# Patient Record
Sex: MALE | Race: WHITE | Employment: OTHER | RURAL
[De-identification: names, ages, dates, MRNs, and addresses within clinical notes are randomized per-mention and may not be internally consistent; named-entity substitution may affect disease eponyms.]

---

## 2017-02-15 ENCOUNTER — OFFICE VISIT (OUTPATIENT)
Dept: FAMILY MEDICINE CLINIC | Age: 52
End: 2017-02-15

## 2017-02-15 VITALS
HEART RATE: 79 BPM | WEIGHT: 217.4 LBS | BODY MASS INDEX: 27.91 KG/M2 | RESPIRATION RATE: 18 BRPM | SYSTOLIC BLOOD PRESSURE: 117 MMHG | DIASTOLIC BLOOD PRESSURE: 82 MMHG | OXYGEN SATURATION: 98 %

## 2017-02-15 DIAGNOSIS — Z00.00 ANNUAL PHYSICAL EXAM: Primary | ICD-10-CM

## 2017-02-15 DIAGNOSIS — R07.1 CHEST PAIN ON BREATHING: ICD-10-CM

## 2017-02-15 DIAGNOSIS — Z71.89 ACP (ADVANCE CARE PLANNING): ICD-10-CM

## 2017-02-15 DIAGNOSIS — F17.210 SMOKING GREATER THAN 30 PACK YEARS: ICD-10-CM

## 2017-02-15 NOTE — ACP (ADVANCE CARE PLANNING)
In the event that he is unable to speak for himself, contact his wife, Rodríguez Falcon, at [phone]  (unog)    Sherwin Cespedes

## 2017-02-15 NOTE — PROGRESS NOTES
Chief Complaint   Patient presents with    Physical         HPI:       is a 49 y.o. male. He is  to Doug (RN at [employer]) and has 2 adult children Arkaitlin Ruvalcaba and Juan Francisco). He is a  and lives in [place] [place] and works in [place]. 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 Smokes 1 PPD for 32 years. Recently experienced chest discomfort with deep breathing lasting a few seconds. No dyspnea. No cough or hemoptysis. Possible asbestos exposure in the [employer]. No weight loss. Appetite is good. Denies exertional pain. Not exercising. New Issues:  He has never had colonoscopy. Desires screening. No Known Allergies    Current Outpatient Prescriptions   Medication Sig    naproxen sodium (ALEVE) 220 mg tablet Take 220 mg by mouth two (2) times daily (with meals).  CYCLOBENZAPRINE HCL (FLEXERIL PO) Take 10 mg by mouth three (3) times daily as needed. No current facility-administered medications for this visit. Past Medical History   Diagnosis Date    Fatigue     Hyperlipidemia     Onychomycosis of toenail     PPD positive, treated      INH for 9 months         ROS:  Denies fever, chills, cough, chest pain, SOB,  nausea, vomiting, or diarrhea. Denies wt loss, wt gain, hemoptysis, hematochezia or melena. Physical Examination:    Visit Vitals    /82 (BP 1 Location: Left arm, BP Patient Position: Sitting)    Pulse 79    Resp 18    Wt 217 lb 6.4 oz (98.6 kg)    SpO2 98%    BMI 27.91 kg/m2     General: Alert and Ox3, Fluent speech  HEENT:  NC/AT, EOMI, OP: clear  Neck:  Supple, no adenopathy, JVD, mass or bruit  Chest:  Clear to Ausculation, without wheezes, rales, rubs or ronchi  Cardiac: RRR  Abdomen:  +BS, soft, nontender without palpable HSM  Extremities:  No cyanosis, clubbing or edema  Neurologic:  Ambulatory without assist, CN 2-12 grossly intact. Moves all extremities.   Skin: no rash  Lymphadenopathy: no cervical or supraclavicular nodes      ASSESSMENT AND PLAN:     1. Excellent BP  2. The patient was counseled on the dangers of tobacco use, and was advised to quit. Reviewed strategies to maximize success, including removing cigarettes and smoking materials from environment. 3.  EKG and CXR Today:  If negative and sx persist, schedule EST. Sx suggest a pleuritic etiology. No sign of DVT or PE based on exam findings today. 4.  Labs today  5. Referral to Dr Daisy Avery [facility]) for Colonoscopy    No orders of the defined types were placed in this encounter.       Ramon Sol MD, [place]

## 2017-02-15 NOTE — MR AVS SNAPSHOT
Visit Information Date & Time Provider Department Dept. Phone Encounter #  
 2/15/2017  9:00 AM Thierno Nguyen, Lori Clark [id number] Upcoming Health Maintenance Date Due Pneumococcal 19-64 Medium Risk (1 of 1 - PPSV23) 5/23/1985 DTaP/Tdap/Td series (1 - Tdap) 5/23/1987 FOBT Q 1 YEAR AGE 50-75 5/23/2016 INFLUENZA AGE 9 TO ADULT 8/1/2016 Allergies as of 2/15/2017  Review Complete On: 2/15/2017 By: Thierno Nguyen MD  
 No Known Allergies Current Immunizations  Never Reviewed Name Date Influenza Vaccine 10/15/2016 Not reviewed this visit You Were Diagnosed With   
  
 Codes Comments Annual physical exam    -  Primary ICD-10-CM: Z00.00 ICD-9-CM: V70.0 Chest pain on breathing     ICD-10-CM: R07.1 ICD-9-CM: 786.52   
 ACP (advance care planning)     ICD-10-CM: Z71.89 ICD-9-CM: V65.49 Smoking greater than 30 pack years     ICD-10-CM: F17.210 ICD-9-CM: 305.1 Vitals BP Pulse Resp Weight(growth percentile) SpO2 BMI  
 117/82 (BP 1 Location: Left arm, BP Patient Position: Sitting) 79 18 217 lb 6.4 oz (98.6 kg) 98% 27.91 kg/m2 Smoking Status Current Every Day Smoker Vitals History BMI and BSA Data Body Mass Index Body Surface Area  
 27.91 kg/m 2 2.27 m 2 Your Updated Medication List  
  
   
This list is accurate as of: 2/15/17  9:59 AM.  Always use your most recent med list.  
  
  
  
  
 ALEVE 220 mg tablet Generic drug:  naproxen sodium Take 220 mg by mouth two (2) times daily (with meals). FLEXERIL PO Take 10 mg by mouth three (3) times daily as needed. We Performed the Following AMB POC EKG ROUTINE W/ 12 LEADS, INTER & REP [99054 CPT(R)] CBC WITH AUTOMATED DIFF [10210 CPT(R)] HEPATITIS C AB [22924 CPT(R)] LIPID PANEL [52094 CPT(R)] METABOLIC PANEL, COMPREHENSIVE [60657 CPT(R)] MA COLLECTION VENOUS BLOOD,VENIPUNCTURE [id number] CPT(R)] PSA W/ REFLX FREE PSA [70407 CPT(R)] REFERRAL TO SURGERY [WRQ371 Custom] Comments:  
 Please evaluate patient for colonoscopy. TSH 3RD GENERATION [45952 CPT(R)] To-Do List   
 Around 02/28/2017 Imaging:  XR CHEST PA LAT Referral Information Referral ID Referred By Referred To  
  
 [id number] [facility], [facility], [address]  Phone: [phone] Fax: [phone] Visits Status Start Date End Date 1 New Request 2/15/17 2/15/18 If your referral has a status of pending review or denied, additional information will be sent to support the outcome of this decision. Patient Instructions If you have any questions regarding [email or web address], you may call [email or web address] support at [phone]. Introducing [facility] SERVICES! Candace Candelario introduces [employer] patient portal. Now you can access parts of your medical record, email your doctor's office, and request medication refills online. 1. In your internet browser, go to [email or web address] 2. Click on the First Time User? Click Here link in the Sign In box. You will see the New Member Sign Up page. 3. Enter your [employer] Access Code exactly as it appears below. You will not need to use this code after youve completed the sign-up process. If you do not sign up before the expiration date, you must request a new code. · [employer] Access Code: [email or web address] Expires: 5/16/2017  9:59 AM 
 
4. Enter the last four digits of your Social Security Number (xxxx) and Date of Birth (mm/dd/yyyy) as indicated and click Submit. You will be taken to the next sign-up page. 5. Create a [employer] ID. This will be your [employer] login ID and cannot be changed, so think of one that is secure and easy to remember. 6. Create a [employer] password. You can change your password at any time. 7. Enter your Password Reset Question and Answer. This can be used at a later time if you forget your password. 8. Enter your e-mail address. You will receive e-mail notification when new information is available in [address] 9. Click Sign Up. You can now view and download portions of your medical record. 10. Click the Download Summary menu link to download a portable copy of your medical information. If you have questions, please visit the Frequently Asked Questions section of the [employer] website. Remember, [employer] is NOT to be used for urgent needs. For medical emergencies, dial 911. Now available from your iPhone and Android! Please provide this summary of care documentation to your next provider. If you have any questions after today's visit, please call [phone].

## 2017-02-16 LAB
ALBUMIN SERPL-MCNC: 4.3 G/DL (ref 3.5–5.5)
ALBUMIN/GLOB SERPL: 2.2 {RATIO} (ref 1.1–2.5)
ALP SERPL-CCNC: 73 IU/L (ref 39–117)
ALT SERPL-CCNC: 10 IU/L (ref 0–44)
AST SERPL-CCNC: 14 IU/L (ref 0–40)
BASOPHILS # BLD AUTO: 0 X10E3/UL (ref 0–0.2)
BASOPHILS NFR BLD AUTO: 0 %
BILIRUB SERPL-MCNC: 0.4 MG/DL (ref 0–1.2)
BUN SERPL-MCNC: 9 MG/DL (ref 6–24)
BUN/CREAT SERPL: 10 (ref 9–20)
CALCIUM SERPL-MCNC: 9 MG/DL (ref 8.7–10.2)
CHLORIDE SERPL-SCNC: 102 MMOL/L (ref 96–106)
CHOLEST SERPL-MCNC: 147 MG/DL (ref 100–199)
CO2 SERPL-SCNC: 20 MMOL/L (ref 18–29)
CREAT SERPL-MCNC: 0.87 MG/DL (ref 0.76–1.27)
EOSINOPHIL # BLD AUTO: 0.2 X10E3/UL (ref 0–0.4)
EOSINOPHIL NFR BLD AUTO: 3 %
ERYTHROCYTE [DISTWIDTH] IN BLOOD BY AUTOMATED COUNT: 12.7 % (ref 12.3–15.4)
GLOBULIN SER CALC-MCNC: 2 G/DL (ref 1.5–4.5)
GLUCOSE SERPL-MCNC: 81 MG/DL (ref 65–99)
HCT VFR BLD AUTO: 44.1 % (ref 37.5–51)
HCV AB S/CO SERPL IA: <0.1 S/CO RATIO (ref 0–0.9)
HDLC SERPL-MCNC: 36 MG/DL
HGB BLD-MCNC: 15.1 G/DL (ref 12.6–17.7)
IMM GRANULOCYTES # BLD: 0.1 X10E3/UL (ref 0–0.1)
IMM GRANULOCYTES NFR BLD: 1 %
LDLC SERPL CALC-MCNC: 87 MG/DL (ref 0–99)
LYMPHOCYTES # BLD AUTO: 1.2 X10E3/UL (ref 0.7–3.1)
LYMPHOCYTES NFR BLD AUTO: 18 %
MCH RBC QN AUTO: 29.7 PG (ref 26.6–33)
MCHC RBC AUTO-ENTMCNC: 34.2 G/DL (ref 31.5–35.7)
MCV RBC AUTO: 87 FL (ref 79–97)
MONOCYTES # BLD AUTO: 1 X10E3/UL (ref 0.1–0.9)
MONOCYTES NFR BLD AUTO: 14 %
NEUTROPHILS # BLD AUTO: 4.2 X10E3/UL (ref 1.4–7)
NEUTROPHILS NFR BLD AUTO: 64 %
PLATELET # BLD AUTO: 193 X10E3/UL (ref 150–379)
POTASSIUM SERPL-SCNC: 5 MMOL/L (ref 3.5–5.2)
PROT SERPL-MCNC: 6.3 G/DL (ref 6–8.5)
PSA SERPL-MCNC: 1.2 NG/ML (ref 0–4)
RBC # BLD AUTO: 5.08 X10E6/UL (ref 4.14–5.8)
REFLEX CRITERIA: NORMAL
SODIUM SERPL-SCNC: 140 MMOL/L (ref 134–144)
TRIGL SERPL-MCNC: 118 MG/DL (ref 0–149)
TSH SERPL DL<=0.005 MIU/L-ACNC: 0.87 UIU/ML (ref 0.45–4.5)
VLDLC SERPL CALC-MCNC: 24 MG/DL (ref 5–40)
WBC # BLD AUTO: 6.7 X10E3/UL (ref 3.4–10.8)

## 2017-02-17 DIAGNOSIS — Z00.00 ANNUAL PHYSICAL EXAM: ICD-10-CM

## 2018-04-19 ENCOUNTER — OFFICE VISIT (OUTPATIENT)
Dept: FAMILY MEDICINE CLINIC | Age: 53
End: 2018-04-19

## 2018-04-19 VITALS
BODY MASS INDEX: 27.7 KG/M2 | RESPIRATION RATE: 12 BRPM | HEART RATE: 68 BPM | SYSTOLIC BLOOD PRESSURE: 130 MMHG | OXYGEN SATURATION: 99 % | TEMPERATURE: 97.9 F | WEIGHT: 215.8 LBS | HEIGHT: 74 IN | DIASTOLIC BLOOD PRESSURE: 84 MMHG

## 2018-04-19 DIAGNOSIS — Z00.00 ROUTINE GENERAL MEDICAL EXAMINATION AT A HEALTH CARE FACILITY: Primary | ICD-10-CM

## 2018-04-19 NOTE — PROGRESS NOTES
1. Have you been to the ER, urgent care clinic since your last visit? Hospitalized since your last visit? No    2. Have you seen or consulted any other health care providers outside of the [address] [place] [address] since your last visit? Include any pap smears or colon screening.  Yes When: 5-2017 babita (work physical)

## 2018-04-19 NOTE — ACP (ADVANCE CARE PLANNING)
Pt has advance directive at home. Recommended to bring by the clinic for inclusion in chart at patient's convenience.  Discussed 4/19/2018

## 2018-04-19 NOTE — PROGRESS NOTES
Chief Complaint   Patient presents with    Annual Wellness Visit         HPI:      Rian Holland is a 47 y.o. male. He is  to Ezra Ben (RN at hospitals) and has 2 adult children Shira Don and Juan Francisco). He is a  and lives in [facility] and works in [place]. 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 Smokes 1 PPD for 32 years. Recently experienced chest discomfort with deep breathing lasting a few seconds. No dyspnea. No cough or hemoptysis. Possible asbestos exposure in the [employer]. No weight loss. Appetite is good. Denies exertional pain. No Known Allergies    Current Outpatient Prescriptions   Medication Sig    naproxen sodium (ALEVE) 220 mg tablet Take 220 mg by mouth two (2) times daily (with meals).  CYCLOBENZAPRINE HCL (FLEXERIL PO) Take 10 mg by mouth three (3) times daily as needed. No current facility-administered medications for this visit. Past Medical History:   Diagnosis Date    Fatigue     Hyperlipidemia     Onychomycosis of toenail     PPD positive, treated     INH for 9 months         ROS:  Denies fever, chills, cough, chest pain, SOB,  nausea, vomiting, or diarrhea. Denies wt loss, wt gain, hemoptysis, hematochezia or melena. Physical Examination:    /84 (BP 1 Location: Left arm, BP Patient Position: Sitting)  Pulse 68  Temp 97.9 °F (36.6 °C) (Oral)   Resp 12  Ht 6' 2\" (1.88 m)  Wt 215 lb 12.8 oz (97.9 kg)  SpO2 99%  BMI 27.71 kg/m2    General: Alert and Ox3, Fluent speech  HEENT:  NC/AT, EOMI, OP: clear  Neck:  Supple, no adenopathy, JVD, mass or bruit  Chest:  Clear to Ausculation, without wheezes, rales, rubs or ronchi  Cardiac: RRR  Abdomen:  +BS, soft, nontender without palpable HSM  Extremities:  No cyanosis, clubbing or edema  Neurologic:  Ambulatory without assist, CN 2-12 grossly intact. Moves all extremities. Skin: no rash  Lymphadenopathy: no cervical or supraclavicular nodes      ASSESSMENT AND PLAN:     1.   He appears to be in good health. Advised to quit tobacco.  Working on this. 2.  ACP update  3. He plans to schedule his colonscopy    No orders of the defined types were placed in this encounter.       Miguel Naranjo MD, [address]

## 2018-04-19 NOTE — MR AVS SNAPSHOT
[address] [facility] [phone] Patient: Eduardo Bello MRN: [id number] WWU:[id number] Visit Information Date & Time Provider Department Dept. Phone Encounter #  
 4/19/2018 10:30 AM Radha Velasquez MD [address] [place] [address] [id number] Follow-up Instructions Return in about 1 year (around 4/19/2019). Follow-up and Disposition History Upcoming Health Maintenance Date Due Pneumococcal 19-64 Medium Risk (1 of 1 - PPSV23) 5/23/1985 DTaP/Tdap/Td series (1 - Tdap) 5/23/1987 FOBT Q 1 YEAR AGE 50-75 5/23/2016 Influenza Age 5 to Adult 8/1/2017 Allergies as of 4/19/2018  Review Complete On: 4/19/2018 By: Radha Velasquez MD  
 No Known Allergies Current Immunizations  Never Reviewed Name Date Influenza Vaccine 10/15/2016 Not reviewed this visit You Were Diagnosed With   
  
 Codes Comments Routine general medical examination at a health care facility    -  Primary ICD-10-CM: Z00.00 ICD-9-CM: V70.0 Vitals BP Pulse Temp Resp Height(growth percentile) Weight(growth percentile) 130/84 (BP 1 Location: Left arm, BP Patient Position: Sitting) 68 97.9 °F (36.6 °C) (Oral) 12 6' 2\" (1.88 m) 215 lb 12.8 oz (97.9 kg) SpO2 BMI Smoking Status 99% 27.71 kg/m2 Current Every Day Smoker BMI and BSA Data Body Mass Index Body Surface Area  
 27.71 kg/m 2 2.26 m 2 Your Updated Medication List  
  
   
This list is accurate as of 4/19/18 11:08 AM.  Always use your most recent med list.  
  
  
  
  
 ALEVE 220 mg tablet Generic drug:  naproxen sodium Take 220 mg by mouth two (2) times daily (with meals). FLEXERIL PO Take 10 mg by mouth three (3) times daily as needed. We Performed the Following CBC WITH AUTOMATED DIFF [31321 CPT(R)] HEMOGLOBIN A1C WITH EAG [32647 CPT(R)] LIPID PANEL [81550 CPT(R)] METABOLIC PANEL, COMPREHENSIVE [42018 CPT(R)] PSA, DIAGNOSTIC (PROSTATE SPECIFIC AG) R0200189 CPT(R)] TSH 3RD GENERATION [38693 CPT(R)] Follow-up Instructions Return in about 1 year (around 4/19/2019). Introducing [facility] SERVICES! Dear Ruth Holden: 
Thank you for requesting a [employer] account. Our records indicate that you already have an active [employer] account. You can access your account anytime at [email or web address] Did you know that you can access your hospital and ER discharge instructions at any time in [employer]? You can also review all of your test results from your hospital stay or ER visit. Additional Information If you have questions, please visit the Frequently Asked Questions section of the [employer] website at [email or web address] Remember, [employer] is NOT to be used for urgent needs. For medical emergencies, dial 911. Now available from your iPhone and Android! Please provide this summary of care documentation to your next provider. If you have any questions after today's visit, please call [phone].

## 2018-04-20 LAB
ALBUMIN SERPL-MCNC: 4.4 G/DL (ref 3.5–5.5)
ALBUMIN/GLOB SERPL: 1.8 {RATIO} (ref 1.2–2.2)
ALP SERPL-CCNC: 66 IU/L (ref 39–117)
ALT SERPL-CCNC: 14 IU/L (ref 0–44)
AST SERPL-CCNC: 13 IU/L (ref 0–40)
BASOPHILS # BLD AUTO: 0 X10E3/UL (ref 0–0.2)
BASOPHILS NFR BLD AUTO: 0 %
BILIRUB SERPL-MCNC: 0.4 MG/DL (ref 0–1.2)
BUN SERPL-MCNC: 10 MG/DL (ref 6–24)
BUN/CREAT SERPL: 10 (ref 9–20)
CALCIUM SERPL-MCNC: 9.1 MG/DL (ref 8.7–10.2)
CHLORIDE SERPL-SCNC: 101 MMOL/L (ref 96–106)
CHOLEST SERPL-MCNC: 160 MG/DL (ref 100–199)
CO2 SERPL-SCNC: 25 MMOL/L (ref 18–29)
CREAT SERPL-MCNC: 1 MG/DL (ref 0.76–1.27)
EOSINOPHIL # BLD AUTO: 0.1 X10E3/UL (ref 0–0.4)
EOSINOPHIL NFR BLD AUTO: 2 %
ERYTHROCYTE [DISTWIDTH] IN BLOOD BY AUTOMATED COUNT: 13.6 % (ref 12.3–15.4)
EST. AVERAGE GLUCOSE BLD GHB EST-MCNC: 100 MG/DL
GFR SERPLBLD CREATININE-BSD FMLA CKD-EPI: 100 ML/MIN/1.73
GFR SERPLBLD CREATININE-BSD FMLA CKD-EPI: 87 ML/MIN/1.73
GLOBULIN SER CALC-MCNC: 2.4 G/DL (ref 1.5–4.5)
GLUCOSE SERPL-MCNC: 79 MG/DL (ref 65–99)
HBA1C MFR BLD: 5.1 % (ref 4.8–5.6)
HCT VFR BLD AUTO: 45.9 % (ref 37.5–51)
HDLC SERPL-MCNC: 40 MG/DL
HGB BLD-MCNC: 15.9 G/DL (ref 13–17.7)
IMM GRANULOCYTES # BLD: 0 X10E3/UL (ref 0–0.1)
IMM GRANULOCYTES NFR BLD: 0 %
LDLC SERPL CALC-MCNC: 101 MG/DL (ref 0–99)
LYMPHOCYTES # BLD AUTO: 1.9 X10E3/UL (ref 0.7–3.1)
LYMPHOCYTES NFR BLD AUTO: 28 %
MCH RBC QN AUTO: 30.4 PG (ref 26.6–33)
MCHC RBC AUTO-ENTMCNC: 34.6 G/DL (ref 31.5–35.7)
MCV RBC AUTO: 88 FL (ref 79–97)
MONOCYTES # BLD AUTO: 0.7 X10E3/UL (ref 0.1–0.9)
MONOCYTES NFR BLD AUTO: 10 %
NEUTROPHILS # BLD AUTO: 4.2 X10E3/UL (ref 1.4–7)
NEUTROPHILS NFR BLD AUTO: 60 %
PLATELET # BLD AUTO: 232 X10E3/UL (ref 150–379)
POTASSIUM SERPL-SCNC: 4.1 MMOL/L (ref 3.5–5.2)
PROT SERPL-MCNC: 6.8 G/DL (ref 6–8.5)
PSA SERPL-MCNC: 1.3 NG/ML (ref 0–4)
RBC # BLD AUTO: 5.23 X10E6/UL (ref 4.14–5.8)
SODIUM SERPL-SCNC: 143 MMOL/L (ref 134–144)
TRIGL SERPL-MCNC: 96 MG/DL (ref 0–149)
TSH SERPL DL<=0.005 MIU/L-ACNC: 0.98 UIU/ML (ref 0.45–4.5)
VLDLC SERPL CALC-MCNC: 19 MG/DL (ref 5–40)
WBC # BLD AUTO: 6.9 X10E3/UL (ref 3.4–10.8)

## 2021-03-05 PROBLEM — Z12.2 ENCOUNTER FOR SCREENING FOR LUNG CANCER: Status: ACTIVE | Noted: 2021-03-05

## 2021-03-05 PROBLEM — R53.83 OTHER FATIGUE: Status: ACTIVE | Noted: 2021-03-05

## 2021-03-05 PROBLEM — R35.1 NOCTURIA: Status: ACTIVE | Noted: 2021-03-05

## 2021-05-03 ENCOUNTER — OFFICE VISIT (OUTPATIENT)
Dept: SURGERY | Age: 56
End: 2021-05-03

## 2021-05-03 VITALS
HEIGHT: 74 IN | DIASTOLIC BLOOD PRESSURE: 85 MMHG | WEIGHT: 230.3 LBS | BODY MASS INDEX: 29.56 KG/M2 | RESPIRATION RATE: 16 BRPM | SYSTOLIC BLOOD PRESSURE: 115 MMHG | HEART RATE: 72 BPM

## 2021-05-03 DIAGNOSIS — Z12.11 COLON CANCER SCREENING: Primary | ICD-10-CM

## 2021-05-03 NOTE — PROGRESS NOTES
Joe Redd is a 48 y.o. male who presents today with the following:  Chief Complaint   Patient presents with    Colon Cancer Screening       HPI    60-year-old male who presents to us as a referral by Dr. Kaylene Hemphill for possible screening colonoscopy. He has had no prior colon evaluation. He has had no family history of colon cancer. He denies any melena or hematochezia or diarrhea or constipation. He denies any unexpected weight loss or change in the caliber of his stool. A few years back he had a single episode of diverticulitis that was outpatient treated. He has completed his COVID-19 vaccinations. His only surgery is a left knee arthroscopy. He stopped smoking 3 years ago prior to which he used to smoke up to 2 packs a day for 35 years. Occasionally on the weekends he will have 1-2 mixed drinks. He is not on any blood thinners. Past Medical History:   Diagnosis Date    Fatigue     Hyperlipidemia     Onychomycosis of toenail     PPD positive, treated     INH for 9 months       Past Surgical History:   Procedure Laterality Date    HX KNEE ARTHROSCOPY Left        Social History     Socioeconomic History    Marital status:      Spouse name: Not on file    Number of children: 2    Years of education: Not on file    Highest education level: Not on file   Occupational History    Occupation:    Social Needs    Financial resource strain: Not on file    Food insecurity     Worry: Not on file     Inability: Not on file   [employer] needs     Medical: Not on file     Non-medical: Not on file   Tobacco Use    Smoking status: Former Smoker     Quit date: 2019     Years since quittin.3    Smokeless tobacco: Current User    Tobacco comment: Vapes.    Substance and Sexual Activity    Alcohol use: Yes     Comment: Social    Drug use: Not on file    Sexual activity: Not on file   Lifestyle    Physical activity     Days per week: Not on file     Minutes per session: Not on file    Stress: Not on file   Relationships    Social connections     Talks on phone: Not on file     Gets together: Not on file     Attends Che[employer] service: Not on file     Active member of club or organization: Not on file     Attends meetings of clubs or organizations: Not on file     Relationship status: Not on file    Intimate partner violence     Fear of current or ex partner: Not on file     Emotionally abused: Not on file     Physically abused: Not on file     Forced sexual activity: Not on file   Other Topics Concern    Not on file   Social History Narrative    Not on file       Family History   Problem Relation Age of Onset    Diabetes Mother     Hypertension Mother     Arthritis-rheumatoid Father     Cancer Father         Lung       No Known Allergies    Current Outpatient Medications   Medication Sig    naproxen sodium (ALEVE) 220 mg tablet Take 220 mg by mouth two (2) times daily (with meals).  CYCLOBENZAPRINE HCL (FLEXERIL PO) Take 10 mg by mouth three (3) times daily as needed.  ondansetron (ZOFRAN ODT) 8 mg disintegrating tablet Take 1 Tab by mouth every eight (8) hours as needed for Nausea. No current facility-administered medications for this visit. The above histories, medications and allergies have been reviewed. Review of Systems   HENT: Positive for congestion and tinnitus. Respiratory: Positive for cough. Musculoskeletal: Positive for back pain. Skin: Positive for itching. Visit Vitals  /85 (BP 1 Location: Left upper arm, BP Patient Position: Sitting)   Pulse 72   Resp 16   Ht 6' 2\" (1.88 m)   Wt 230 lb 4.8 oz (104.5 kg)   BMI 29.57 kg/m²     Physical Exam  Constitutional:       Appearance: Normal appearance. Cardiovascular:      Rate and Rhythm: Normal rate and regular rhythm. Pulmonary:      Effort: Pulmonary effort is normal.      Breath sounds: Normal breath sounds. Abdominal:      General: There is no distension. Palpations: Abdomen is soft. There is no mass. Tenderness: There is no abdominal tenderness. Neurological:      Mental Status: He is alert. 1. Colon cancer screening  Recommend colonoscopy. The procedure was explained in detail including the risks and benefits. Risks shared included risks of missed lesions, incomplete exam, colon injury or perforation. Risks associated with anesthesia were also discussed. The patient wishes to proceed and we will schedule. The patient was counseled at length about the risks of niki Covid-19 during their perioperative period and any recovery window from their procedure. The patient was made aware that niki Covid-19  may worsen their prognosis for recovering from their procedure and lend to a higher morbidity and/or mortality risk. All material risks, benefits, and reasonable alternatives including postponing the procedure were discussed. The patient does  wish to proceed with the procedure at this time. Follow-up and Dispositions    · Return for post procedure.          Ayesha Bertrand MD

## 2021-05-03 NOTE — PATIENT INSTRUCTIONS

## 2021-05-03 NOTE — PROGRESS NOTES
1. Have you been to the ER, urgent care clinic since your last visit? Hospitalized since your last visit? new pt    2. Have you seen or consulted any other health care providers outside of the [address] [place] [address] since your last visit? Include any pap smears or colon screening.  new pt

## 2021-05-26 ENCOUNTER — ANESTHESIA EVENT (OUTPATIENT)
Dept: SURGERY | Age: 56
End: 2021-05-26
Payer: COMMERCIAL

## 2021-05-26 NOTE — ANESTHESIA PREPROCEDURE EVALUATION
Relevant Problems   No relevant active problems       Anesthetic History   No history of anesthetic complications            Review of Systems / Medical History  Patient summary reviewed, nursing notes reviewed and pertinent labs reviewed    Pulmonary          Smoker (former)      Comments: H/O positive PPD   Neuro/Psych   Within defined limits           Cardiovascular              Hyperlipidemia         GI/Hepatic/Renal  Within defined limits              Endo/Other  Within defined limits           Other Findings              Physical Exam    Airway  Mallampati: I  TM Distance: > 6 cm  Neck ROM: normal range of motion   Mouth opening: Normal     Cardiovascular    Rhythm: regular  Rate: normal         Dental  No notable dental hx       Pulmonary  Breath sounds clear to auscultation               Abdominal  GI exam deferred       Other Findings            Anesthetic Plan    ASA: 2  Anesthesia type: MAC          Induction: Intravenous  Anesthetic plan and risks discussed with: Patient

## 2021-06-11 NOTE — PERIOP NOTES
[address] [place] [address]  SURGICAL PRE-ADMISSION INSTRUCTIONS    ARRIVAL  · You will be called the day before your surgery with your expected arrival time. · Sign in at the  of the hospital.  You will be directed to the Surgical Waiting Room. · Please arrive at your scheduled appointment time. You have been scheduled to arrive for your procedure one or two hours prior to the expected start time of your procedure. · Every effort will be made to minimize your wait but please be aware that unforeseen circumstances may affect our schedule. EATING  · DO NOT EAT OR DRINK ANYTHING AFTER MIDNIGHT ON THE EVENING BEFORE YOUR SURGERY OR ON THE DAY OF YOUR SURGERY except for your medications (as instructed) with a sip of water. · Do not use gum, mints or lozenges on the morning of your surgery. · Please do not smoke or chew tobacco before your surgery. MEDICATIONS   · Take the following medications on the morning of your surgery with the smallest amount of water possible : NONE    STOP THESE MEDICATIONS AT THE TIMES LISTED BELOW  NSAIDS (Indocin, Ibuprofen, Naprosyn) ;  7 days before     DRIVING/TRANSPORATION  · Have a responsible adult to drive you home from the hospital and to stay with you over night. Please have them plan to remain in the hospital during your surgery. Your surgery will not be done if you do not have a responsible adult to take you home and to stay with you. · If you have arranged for public transport, you must have a responsible adult to ride with you (who is not the ). · You may not drive for 24 hours after anesthesia. PREPARATION  · If you have a Living WiIl/Advance Directive, please bring a copy with you to scan into your chart. · Please DO NOT wear makeup or nail polish  · Please leave valuables at home,  DO NOT wear jewelry. · Wear loose, comfortable clothing that is large enough to cover a bulky dressing.     SPECIAL INSTRUCTIONS:  · Follow your surgeon's instructions for preoperative bowel prep.     Reviewed above preoperative instructions and answered questions during preadmission testing consult appointment    Patient:  Yanelisla Kelsey   Date:     June 11, 2021  Time:   11:39 AM    RN:  Bianca Beard RN    Date:     June 11, 2021  Time:   11:39 AM

## 2021-06-16 NOTE — H&P
Jose Davis is a 55 y.o. male who presents today with the following:  No chief complaint on file. HPI    64-year-old male who presents to us as a referral by Dr. Betzy Humphries for possible screening colonoscopy. He has had no prior colon evaluation. He has had no family history of colon cancer. He denies any melena or hematochezia or diarrhea or constipation. He denies any unexpected weight loss or change in the caliber of his stool. A few years back he had a single episode of diverticulitis that was outpatient treated. He has completed his COVID-19 vaccinations. His only surgery is a left knee arthroscopy. He stopped smoking 3 years ago prior to which he used to smoke up to 2 packs a day for 35 years. Occasionally on the weekends he will have 1-2 mixed drinks. He is not on any blood thinners. Past Medical History:   Diagnosis Date    Fatigue     Hyperlipidemia     Onychomycosis of toenail     PPD positive, treated     INH for 9 months       Past Surgical History:   Procedure Laterality Date    HX KNEE ARTHROSCOPY Left        Social History     Socioeconomic History    Marital status:      Spouse name: Not on file    Number of children: 2    Years of education: Not on file    Highest education level: Not on file   Occupational History    Occupation:    Tobacco Use    Smoking status: Former Smoker     Quit date: 2019     Years since quittin.4    Smokeless tobacco: Current User    Tobacco comment: Vapes.    Substance and Sexual Activity    Alcohol use: Yes     Comment: Social    Drug use: Not on file    Sexual activity: Not on file   Other Topics Concern    Not on file   Social History Narrative    Not on file     Social Determinants of Health     Financial Resource Strain:     Difficulty of Paying Living Expenses:    Food Insecurity:     Worried About Running Out of Food in the Last Year:     [address] in the Last Year:    Transportation Needs:     Lack of Transportation (Medical):  Lack of Transportation (Non-Medical):    Physical Activity:     Days of Exercise per Week:     Minutes of Exercise per Session:    Stress:     Feeling of Stress :    Social Connections:     Frequency of Communication with Friends and Family:     Frequency of Social Gatherings with Friends and Family:     Attends [employer] Services:     Active Member of Clubs or Organizations:     Attends Club or Organization Meetings:     Marital Status:    Intimate Partner Violence:     Fear of Current or Ex-Partner:     Emotionally Abused:     Physically Abused:     Sexually Abused:        Family History   Problem Relation Age of Onset    Diabetes Mother     Hypertension Mother     Arthritis-rheumatoid Father     Cancer Father         Lung       No Known Allergies    No current facility-administered medications for this encounter. Current Outpatient Medications   Medication Sig    naproxen sodium (ALEVE) 220 mg tablet Take 220 mg by mouth two (2) times daily (with meals).  CYCLOBENZAPRINE HCL (FLEXERIL PO) Take 10 mg by mouth three (3) times daily as needed. The above histories, medications and allergies have been reviewed. Review of Systems   HENT: Positive for congestion and tinnitus. Respiratory: Positive for cough. Musculoskeletal: Positive for back pain. Skin: Positive for itching. There were no vitals taken for this visit. Physical Exam  Constitutional:       Appearance: Normal appearance. Cardiovascular:      Rate and Rhythm: Normal rate and regular rhythm. Pulmonary:      Effort: Pulmonary effort is normal.      Breath sounds: Normal breath sounds. Abdominal:      General: There is no distension. Palpations: Abdomen is soft. There is no mass. Tenderness: There is no abdominal tenderness. Neurological:      Mental Status: He is alert. 1. Colon cancer screening  Recommend colonoscopy.   The procedure was explained in detail including the risks and benefits. Risks shared included risks of missed lesions, incomplete exam, colon injury or perforation. Risks associated with anesthesia were also discussed. The patient wishes to proceed and we will schedule. The patient was counseled at length about the risks of niki Covid-19 during their perioperative period and any recovery window from their procedure. The patient was made aware that niki Covid-19  may worsen their prognosis for recovering from their procedure and lend to a higher morbidity and/or mortality risk. All material risks, benefits, and reasonable alternatives including postponing the procedure were discussed. The patient does  wish to proceed with the procedure at this time.               Pj Muse MD

## 2021-06-17 ENCOUNTER — ANESTHESIA (OUTPATIENT)
Dept: SURGERY | Age: 56
End: 2021-06-17
Payer: COMMERCIAL

## 2021-06-17 ENCOUNTER — HOSPITAL ENCOUNTER (OUTPATIENT)
Age: 56
Setting detail: OUTPATIENT SURGERY
Discharge: HOME | End: 2021-06-17
Attending: SURGERY | Admitting: SURGERY
Payer: COMMERCIAL

## 2021-06-17 VITALS
HEIGHT: 74 IN | WEIGHT: 230 LBS | SYSTOLIC BLOOD PRESSURE: 125 MMHG | RESPIRATION RATE: 16 BRPM | BODY MASS INDEX: 29.52 KG/M2 | TEMPERATURE: 97 F | OXYGEN SATURATION: 99 % | DIASTOLIC BLOOD PRESSURE: 95 MMHG | HEART RATE: 61 BPM

## 2021-06-17 LAB — COLONOSCOPY, EXTERNAL: NORMAL

## 2021-06-17 PROCEDURE — 74011250636 HC RX REV CODE- 250/636: Performed by: ANESTHESIOLOGY

## 2021-06-17 PROCEDURE — 76060000032 HC ANESTHESIA 0.5 TO 1 HR: Performed by: SURGERY

## 2021-06-17 PROCEDURE — 88305 TISSUE EXAM BY PATHOLOGIST: CPT

## 2021-06-17 PROCEDURE — 77030037006 HC FCPS BIOP ENDOSC DISP OCOA -A: Performed by: SURGERY

## 2021-06-17 PROCEDURE — 76010000138 HC OR TIME 0.5 TO 1 HR: Performed by: SURGERY

## 2021-06-17 PROCEDURE — 74011250636 HC RX REV CODE- 250/636: Performed by: SURGERY

## 2021-06-17 PROCEDURE — 76210000063 HC OR PH I REC FIRST 0.5 HR: Performed by: SURGERY

## 2021-06-17 PROCEDURE — 77030003657 HC NDL SCLER BSC -B: Performed by: SURGERY

## 2021-06-17 PROCEDURE — 77030020018 HC MRKR ENDOSC SPOT 5ML SYR GISP -B: Performed by: SURGERY

## 2021-06-17 PROCEDURE — 2709999900 HC NON-CHARGEABLE SUPPLY: Performed by: SURGERY

## 2021-06-17 RX ORDER — SODIUM CHLORIDE, SODIUM LACTATE, POTASSIUM CHLORIDE, CALCIUM CHLORIDE 600; 310; 30; 20 MG/100ML; MG/100ML; MG/100ML; MG/100ML
125 INJECTION, SOLUTION INTRAVENOUS CONTINUOUS
Status: DISCONTINUED | OUTPATIENT
Start: 2021-06-17 | End: 2021-06-17 | Stop reason: HOSPADM

## 2021-06-17 RX ORDER — PROPOFOL 10 MG/ML
INJECTION, EMULSION INTRAVENOUS AS NEEDED
Status: DISCONTINUED | OUTPATIENT
Start: 2021-06-17 | End: 2021-06-17 | Stop reason: HOSPADM

## 2021-06-17 RX ORDER — SODIUM CHLORIDE 0.9 % (FLUSH) 0.9 %
5-40 SYRINGE (ML) INJECTION AS NEEDED
Status: DISCONTINUED | OUTPATIENT
Start: 2021-06-17 | End: 2021-06-17 | Stop reason: HOSPADM

## 2021-06-17 RX ORDER — SODIUM CHLORIDE 0.9 % (FLUSH) 0.9 %
5-40 SYRINGE (ML) INJECTION EVERY 8 HOURS
Status: DISCONTINUED | OUTPATIENT
Start: 2021-06-17 | End: 2021-06-17 | Stop reason: HOSPADM

## 2021-06-17 RX ORDER — MIDAZOLAM HYDROCHLORIDE 1 MG/ML
INJECTION, SOLUTION INTRAMUSCULAR; INTRAVENOUS AS NEEDED
Status: DISCONTINUED | OUTPATIENT
Start: 2021-06-17 | End: 2021-06-17 | Stop reason: HOSPADM

## 2021-06-17 RX ADMIN — MIDAZOLAM 4 MG: 1 INJECTION INTRAMUSCULAR; INTRAVENOUS at 09:40

## 2021-06-17 RX ADMIN — PROPOFOL 50 MG: 10 INJECTION, EMULSION INTRAVENOUS at 09:40

## 2021-06-17 RX ADMIN — PROPOFOL 20 MG: 10 INJECTION, EMULSION INTRAVENOUS at 09:46

## 2021-06-17 RX ADMIN — PROPOFOL 30 MG: 10 INJECTION, EMULSION INTRAVENOUS at 10:02

## 2021-06-17 RX ADMIN — PROPOFOL 20 MG: 10 INJECTION, EMULSION INTRAVENOUS at 09:54

## 2021-06-17 RX ADMIN — PROPOFOL 50 MG: 10 INJECTION, EMULSION INTRAVENOUS at 09:43

## 2021-06-17 RX ADMIN — SODIUM CHLORIDE, POTASSIUM CHLORIDE, SODIUM LACTATE AND CALCIUM CHLORIDE 125 ML/HR: 600; 310; 30; 20 INJECTION, SOLUTION INTRAVENOUS at 09:33

## 2021-06-17 RX ADMIN — PROPOFOL 30 MG: 10 INJECTION, EMULSION INTRAVENOUS at 09:49

## 2021-06-17 NOTE — BRIEF OP NOTE
Brief Postoperative Note    Patient: Hilda Bass  YOB: 1965  MRN: [id number]    Date of Procedure: 6/17/2021     Pre-Op Diagnosis: SCREENING    Post-Op Diagnosis: Same as preoperative diagnosis. Procedure(s):  COLONOSCOPY WITH COLD FORCEP POLYPECTOMIES X 6  AND PROXIMAL TRANSVERSE TATOOING    Surgeon(s):  Carlos De Jesus MD    Surgical Assistant: None    Anesthesia: MAC     Estimated Blood Loss (mL): Minimal    Complications: None    Specimens:   ID Type Source Tests Collected by Time Destination   1 : Ascending colon lipoma Preservative Colon, Ascending  Carlos De Jesus MD 6/17/2021 0499 Pathology   2 : Proximal Transverse colon polyp Preservative Colon, Transverse  Carlos De Jesus MD 6/17/2021 8076 Pathology   3 : Polyp at 90 cm Preservative Colon  Carlos De Jesus MD 6/17/2021 1013 Pathology   4 : Polyp at 85 cm Preservative Colon  Carlos De Jesus MD 6/17/2021 1016 Pathology   5 : Polyps X 2 at 80 cm Preservative Colon  Carlos De Jesus MD 6/17/2021 1017 Pathology   6 : Biopsy of Colonic Mucosa at 70 cm Preservative Colon  Carlos De Jesus MD 6/17/2021 1018 Pathology   7 : Polyp at 60 cm Preservative Colon  Carlos De Jesus MD 6/17/2021 1019 Pathology        Implants: * No implants in log *    Drains: * No LDAs found *    Findings: 1. Multiple polyps with sessile polyp at proximal transverse colon biopsied and tattoo injection placed distal to lesion. 2. Ascending colon lipoma  3. Diverticulosis    4. Internal and external hemorrhoids  5.  Slightly raised mucosa at 70 cm      Electronically Signed by Osbaldo Khanna MD on 6/17/2021 at 10:22 AM

## 2021-06-17 NOTE — DISCHARGE INSTRUCTIONS
Patient Education        Colonoscopy: What to Expect at [address] [place] [address]  After a colonoscopy, you'll stay at the clinic for 1 to 2 hours until the medicines wear off. Then you can go home. But you'll need to arrange for a ride. Your doctor will tell you when you can eat and do your other usual activities. Your doctor will talk to you about when you'll need your next colonoscopy. Your doctor can help you decide how often you need to be checked. This will depend on the results of your test and your risk for colorectal cancer. After the test, you may be bloated or have gas pains. You may need to pass gas. If a biopsy was done or a polyp was removed, you may have streaks of blood in your stool (feces) for a few days. Problems such as heavy rectal bleeding may not occur until several weeks after the test. This isn't common. But it can happen after polyps are removed. This care sheet gives you a general idea about how long it will take for you to recover. But each person recovers at a different pace. Follow the steps below to get better as quickly as possible. How can you care for yourself at home? Activity    · Rest when you feel tired.     · You can do your normal activities when it feels okay to do so. Diet    · Follow your doctor's directions for eating.     · Unless your doctor has told you not to, drink plenty of fluids. This helps to replace the fluids that were lost during the colon prep.     · Do not drink alcohol. Medicines    · Your doctor will tell you if and when you can restart your medicines. He or she will also give you instructions about taking any new medicines.     · If you take aspirin or some other blood thinner, ask your doctor if and when to start taking it again.  Make sure that you understand exactly what your doctor wants you to do.     · If polyps were removed or a biopsy was done during the test, your doctor may tell you not to take aspirin or other anti-inflammatory medicines for a few days. These include ibuprofen (Advil, Motrin) and naproxen (Aleve). Other instructions    · For your safety, do not drive or operate machinery until the medicine wears off and you can think clearly. Your doctor may tell you not to drive or operate machinery until the day after your test.     · Do not sign legal documents or make major decisions until the medicine wears off and you can think clearly. The anesthesia can make it hard for you to fully understand what you are agreeing to. Follow-up care is a key part of your treatment and safety. Be sure to make and go to all appointments, and call your doctor if you are having problems. It's also a good idea to know your test results and keep a list of the medicines you take. When should you call for help? Call 911 anytime you think you may need emergency care. For example, call if:    · You passed out (lost consciousness).     · You pass maroon or bloody stools.     · You have trouble breathing. Call your doctor now or seek immediate medical care if:    · You have pain that does not get better after you take pain medicine.     · You are sick to your stomach or cannot drink fluids.     · You have new or worse belly pain.     · You have blood in your stools.     · You have a fever.     · You cannot pass stools or gas. Watch closely for changes in your health, and be sure to contact your doctor if you have any problems. Where can you learn more? Go to http://www.gray.com/  Enter E264 in the search box to learn more about \"Colonoscopy: What to Expect at Home. \"  Current as of: December 17, 2020               Content Version: 12.8  © 0342-2399 [employer]. Care instructions adapted under license by [employer] (which disclaims liability or warranty for this information).  If you have questions about a medical condition or this instruction, always ask your healthcare professional. [employer] disclaims any warranty or liability for your use of this information.

## 2021-06-17 NOTE — ANESTHESIA POSTPROCEDURE EVALUATION
Procedure(s):  COLONOSCOPY WITH COLD FORCEP POLYPECTOMIES X 6  AND PROXIMAL TRANSVERSE TATOOING AND COLD FORCEP BIOPSIES X 2.. MAC    Anesthesia Post Evaluation      Multimodal analgesia: multimodal analgesia not used between 6 hours prior to anesthesia start to PACU discharge  Patient location during evaluation: bedside  Patient participation: complete - patient participated  Level of consciousness: awake and alert  Pain score: 0  Pain management: adequate  Airway patency: patent  Anesthetic complications: no  Cardiovascular status: acceptable  Respiratory status: acceptable  Hydration status: acceptable  Post anesthesia nausea and vomiting:  none  Final Post Anesthesia Temperature Assessment:  Normothermia (36.0-37.5 degrees C)      INITIAL Post-op Vital signs:   Vitals Value Taken Time   /95 06/17/21 1029   Temp 36.1 °C (97 °F) 06/17/21 1013   Pulse 59 06/17/21 1029   Resp 9 06/17/21 1029   SpO2 99 % 06/17/21 1029   Vitals shown include unvalidated device data.

## 2021-06-17 NOTE — INTERVAL H&P NOTE
Update History & Physical    The Patient's History and Physical of June 16, 2021 was reviewed with the patient and I examined the patient. There was no change. The surgical site was confirmed by the patient and me. Plan:  The risk, benefits, expected outcome, and alternative to the recommended procedure have been discussed with the patient. Patient understands and wants to proceed with the procedure.     Electronically signed by Werner Lopez MD on 6/17/2021 at 9:31 AM

## 2021-06-17 NOTE — OP NOTES
[facility]  OPERATIVE REPORT    Name:  John Fuchs  MR#:  [id number]  :  1965  ACCOUNT #:  [de-identified]  DATE OF SERVICE:  2021    PREOPERATIVE DIAGNOSIS:  Screening colonoscopy. POSTOPERATIVE DIAGNOSIS:  Screening colonoscopy. PROCEDURE PERFORMED:  Colonoscopy with cold forceps polypectomy x6, biopsying of colonic mucosa at 70 cm and tattoo injection. SURGEON:  Lalit Brothers MD    ASSISTANT:  None    ANESTHESIA:  MAC.    ESTIMATED BLOOD LOSS:  Minimal.    COMPLICATIONS:  None. SPECIMENS REMOVED:  1. Biopsy of ascending colon lipoma. 2.  Proximal transverse colon, sessile polyp biopsies. 3.  Polyp at 90 cm. 4.  Polyp at 85 cm. 5.  Polyps x2 at 80 cm. 6.  Biopsy of colonic mucosa at 70 cm. 7.  Polyp at 60 cm. IMPLANTS:  None. FINDINGS:  1.  Multiple polyps with a sessile polyp at the proximal transverse colon which was biopsied and tattoo injection was placed distal to the lesion. 2.  Ascending colon lipoma which was biopsied. 3.  Diverticulosis. 4.  Internal and external hemorrhoids. 5.  Slightly raised mucosa at 70 cm which was biopsied. DISPOSITION:  Stable. PROCEDURE:  The patient was brought to the operative theater. Monitoring devices and nasal cannula O2 were placed per Anesthesia and the patient was placed in left side down decubitus position. IV sedation was administered and a time-out was performed. Digital rectal exam was performed which revealed internal and external hemorrhoids. A well-lubricated Olympus colonoscope was introduced in the patient's rectum and advanced to the cecum without any difficulty. The cecum was identified by identification of the ileocecal valve and the terminal ileum and the appendiceal orifice. The terminal ileum was intubated which appeared to be normal.  The cecum showed no significant abnormalities. In the mid ascending colon, there was a lesion that was felt to be a lipoma.   Representative biopsy was obtained. It was relatively small. The scope was then pulled back with no additional lesions seen in the ascending colon. Just distal to the hepatic flexure in the transverse colon, was a sessile lesion along the fold. This appeared to be a sessile adenomatous polyp. Biopsies were obtained. Due to its nature along a fold, I did not feel comfortable performing a snare polypectomy on the lesion although it did not appear to be extremely large. It measured approximately less than 2 cm in size. After biopsies had been obtained, tattoo injection was placed just distal to the lesion. A transverse colon polyp was also removed by cold biopsy forceps at 90 cm. Additional colon polyps were removed at 85 cm and at 80 cm x2. At 70 cm, there was a slightly raised area of colonic mucosa. A representative biopsy was obtained within the area, it flattened out. We also found an additional polyp at 60 cm reach which was removed by cold biopsy forceps. The patient did have scattered diverticulosis that was most pronounced in the sigmoid colon. The scope was pulled back into the rectum and retroflexed which revealed internal hemorrhoids. The scope was then straightened out and carefully withdrawn. The patient tolerated procedure well and was transferred to PACU in stable condition. Depending on what the biopsies show, we will determine if the patient's needs referral for possible EMR.       Nicolette Brennan MD      [id number]  D:  06/17/2021 10:30  T:  06/17/2021 12:34  JOB #:  [id number]

## 2021-06-28 ENCOUNTER — OFFICE VISIT (OUTPATIENT)
Dept: SURGERY | Age: 56
End: 2021-06-28
Payer: COMMERCIAL

## 2021-06-28 VITALS
DIASTOLIC BLOOD PRESSURE: 79 MMHG | HEIGHT: 74 IN | SYSTOLIC BLOOD PRESSURE: 129 MMHG | HEART RATE: 63 BPM | WEIGHT: 230.2 LBS | BODY MASS INDEX: 29.54 KG/M2

## 2021-06-28 DIAGNOSIS — K63.5 SESSILE COLONIC POLYP: Primary | ICD-10-CM

## 2021-06-28 PROCEDURE — 99213 OFFICE O/P EST LOW 20 MIN: CPT | Performed by: SURGERY

## 2021-06-28 NOTE — LETTER
6/30/2021 11:11 AM    Patient:  Shannon Aguilar   YOB: 1965  Date of Visit: 6/28/2021      Dear Minnie Burns MD  [address] [place] [address]  [facility]  [facility] [employer]:      I am referring Mr. Amy Lara to you for evaluation/treatment and possible EMR. Below are the relevant portions of my assessment and plan of care. He underwent colonoscopy by me back on June 17. He had a total of 7 adenomatous polyps. In the proximal transverse colon there was a sessile polyp that measured about 1 to 1-1/2 cm on a fold. Biopsies have shown this to be a tubular adenoma. There was no mention of any significant dysplasia. Tattoo injection was performed just distal to this lesion. I also would appreciate your opinion evaluation of what I suspect to be a lipoma in the ascending colon. Biopsy showed benign mucosa but no submucosa was present at the time of the biopsy specimen. This lesion did not appear to be suspicious to me. If you have questions, please do not hesitate to call me. I look forward to following Mr. Aviva Al along with you.         Sincerely,      Carolin Espino MD

## 2021-06-29 ENCOUNTER — DOCUMENTATION ONLY (OUTPATIENT)
Dept: SURGERY | Age: 56
End: 2021-06-29

## 2021-06-30 NOTE — PATIENT INSTRUCTIONS
Colon Polyps: Care Instructions  Your Care Instructions     Colon polyps are growths in the colon or the rectum. The cause of most colon polyps is not known, and most people who get them do not have any problems. But a certain kind can turn into cancer. For this reason, regular testing for colon polyps is important for people as they get older. It is also important for anyone who has an increased risk for colon cancer. Polyps are usually found through routine colon cancer screening tests. Although most colon polyps are not cancerous, they are usually removed and then tested for cancer. Screening for colon cancer saves lives because the cancer can usually be cured if it is caught early. If you have a polyp that is the type that can turn into cancer, you may need more tests to examine your entire colon. The doctor will remove any other polyps that he or she finds, and you will be tested more often. Follow-up care is a key part of your treatment and safety. Be sure to make and go to all appointments, and call your doctor if you are having problems. It's also a good idea to know your test results and keep a list of the medicines you take. How can you care for yourself at home? Regular exams to look for colon polyps are the best way to prevent polyps from turning into colon cancer. These can include stool tests, sigmoidoscopy, colonoscopy, and CT colonography. Talk with your doctor about a testing schedule that is right for you. To prevent polyps  There is no home treatment that can prevent colon polyps. But these steps may help lower your risk for cancer. · Stay active. Being active can help you get to and stay at a healthy weight. Try to exercise on most days of the week. Walking is a good choice. · Eat well. Choose a variety of vegetables, fruits, legumes (such as peas and beans), fish, poultry, and whole grains. · Do not smoke.  If you need help quitting, talk to your doctor about stop-smoking programs and medicines. These can increase your chances of quitting for good. · If you drink alcohol, limit how much you drink. Limit alcohol to 2 drinks a day for men and 1 drink a day for women. When should you call for help? Call your doctor now or seek immediate medical care if:    · You have severe belly pain.     · Your stools are maroon or very bloody. Watch closely for changes in your health, and be sure to contact your doctor if:    · You have a fever.     · You have nausea or vomiting.     · You have a change in bowel habits (new constipation or diarrhea).     · Your symptoms get worse or are not improving as expected. Where can you learn more? Go to http://www.mora.com/  Enter C571 in the search box to learn more about \"Colon Polyps: Care Instructions. \"  Current as of: December 17, 2020               Content Version: 12.8  © 5329-1450 [employer]. Care instructions adapted under license by [employer] (which disclaims liability or warranty for this information). If you have questions about a medical condition or this instruction, always ask your healthcare professional. [address] any warranty or liability for your use of this information.

## 2021-06-30 NOTE — PROGRESS NOTES
[address] [facility]      Clinic Note - Follow up    Subjective     Britney Hameed returns for scheduled follow up today. He is status post colonoscopy that was performed back on June 17. He had multiple adenomatous polyps. These were all tubular adenomas. They extended from the transverse colon to 60 cm in the colon. He also had an ascending colon lipoma. The proximal transverse colon polyp was sessile on a fold. Biopsy of shown this lesion to be a tubular adenoma as well. Objective     Visit Vitals  /79 (BP 1 Location: Left upper arm, BP Patient Position: Sitting)   Pulse 63   Ht 6' 2\" (1.88 m)   Wt 230 lb 3.2 oz (104.4 kg)   BMI 29.56 kg/m²         PE  GEN - Awake, alert, communicating appropriately. NAD    Labs     FINAL PATHOLOGIC DIAGNOSIS   1. Ascending colon lipoma, biopsy:   Benign colonic mucosa, no pathologic diagnosis; no submucosa present for assessment of possible lipoma. 2. Transverse colon polyp, biopsy:   Tubular adenoma (multiple fragments). 3. Colon polyp at 90 cm, biopsy:   Tubular adenoma. 4. Colon polyp at 80 cm, biopsy:   Tubular adenoma. 5. Colon polyps at 80 cm, biopsy:   Tubular adenoma (multiple fragments). 6. Colon at 70 cm, biopsy:   Tubular adenoma (at least two fragments). 7. Colon polyp at 60 cm, biopsy:   Tubular adenoma                         Assessment     Britney Hameed is a 55 y. o.yr old male with a sessile adenomatous polyp that was not removed at time of procedure    Plan     We will refer to Dr. Marielos Otero for possible EMR. Tattoo injection was performed at the time of the procedure. The tattoo was placed just distal to the lesion.     Tutu Morocho MD    CC: Dr. Marielos Otero

## 2021-11-12 ENCOUNTER — HOSPITAL ENCOUNTER (OUTPATIENT)
Dept: PREADMISSION TESTING | Age: 56
Discharge: HOME | End: 2021-11-12
Payer: COMMERCIAL

## 2021-11-12 LAB
SARS-COV-2, XPLCVT: NOT DETECTED
SOURCE, COVRS: NORMAL

## 2021-11-12 PROCEDURE — U0005 INFEC AGEN DETEC AMPLI PROBE: HCPCS

## 2021-11-17 ENCOUNTER — ANESTHESIA (OUTPATIENT)
Dept: ENDOSCOPY | Age: 56
End: 2021-11-17
Payer: COMMERCIAL

## 2021-11-17 ENCOUNTER — ANESTHESIA EVENT (OUTPATIENT)
Dept: ENDOSCOPY | Age: 56
End: 2021-11-17
Payer: COMMERCIAL

## 2021-11-17 ENCOUNTER — HOSPITAL ENCOUNTER (OUTPATIENT)
Age: 56
Setting detail: OUTPATIENT SURGERY
Discharge: HOME | End: 2021-11-17
Attending: INTERNAL MEDICINE | Admitting: INTERNAL MEDICINE
Payer: COMMERCIAL

## 2021-11-17 VITALS
OXYGEN SATURATION: 98 % | TEMPERATURE: 97.5 F | RESPIRATION RATE: 14 BRPM | SYSTOLIC BLOOD PRESSURE: 132 MMHG | BODY MASS INDEX: 28.42 KG/M2 | HEART RATE: 76 BPM | WEIGHT: 221.4 LBS | DIASTOLIC BLOOD PRESSURE: 86 MMHG | HEIGHT: 74 IN

## 2021-11-17 PROCEDURE — 88305 TISSUE EXAM BY PATHOLOGIST: CPT

## 2021-11-17 PROCEDURE — 77030013992 HC SNR POLYP ENDOSC BSC -B: Performed by: INTERNAL MEDICINE

## 2021-11-17 PROCEDURE — 74011250636 HC RX REV CODE- 250/636: Performed by: NURSE ANESTHETIST, CERTIFIED REGISTERED

## 2021-11-17 PROCEDURE — 77030038665 HC SOL ELEVIEW INJ AGNT ARPH -B: Performed by: INTERNAL MEDICINE

## 2021-11-17 PROCEDURE — 74011000250 HC RX REV CODE- 250: Performed by: NURSE ANESTHETIST, CERTIFIED REGISTERED

## 2021-11-17 PROCEDURE — 74011250636 HC RX REV CODE- 250/636: Performed by: INTERNAL MEDICINE

## 2021-11-17 PROCEDURE — 2709999900 HC NON-CHARGEABLE SUPPLY: Performed by: INTERNAL MEDICINE

## 2021-11-17 PROCEDURE — 77030010936 HC CLP LIG BSC -C: Performed by: INTERNAL MEDICINE

## 2021-11-17 PROCEDURE — 76040000007: Performed by: INTERNAL MEDICINE

## 2021-11-17 PROCEDURE — 77030003657 HC NDL SCLER BSC -B: Performed by: INTERNAL MEDICINE

## 2021-11-17 PROCEDURE — 76060000032 HC ANESTHESIA 0.5 TO 1 HR: Performed by: INTERNAL MEDICINE

## 2021-11-17 DEVICE — CLIP
Type: IMPLANTABLE DEVICE | Site: TRANSVERSE COLON | Status: FUNCTIONAL
Brand: RESOLUTION 360™ ULTRA CLIP

## 2021-11-17 RX ORDER — EPINEPHRINE 0.1 MG/ML
1 INJECTION INTRACARDIAC; INTRAVENOUS
Status: DISCONTINUED | OUTPATIENT
Start: 2021-11-17 | End: 2021-11-17 | Stop reason: HOSPADM

## 2021-11-17 RX ORDER — SODIUM CHLORIDE 9 MG/ML
75 INJECTION, SOLUTION INTRAVENOUS CONTINUOUS
Status: DISCONTINUED | OUTPATIENT
Start: 2021-11-17 | End: 2021-11-17 | Stop reason: HOSPADM

## 2021-11-17 RX ORDER — SODIUM CHLORIDE 0.9 % (FLUSH) 0.9 %
5-40 SYRINGE (ML) INJECTION EVERY 8 HOURS
Status: DISCONTINUED | OUTPATIENT
Start: 2021-11-17 | End: 2021-11-17 | Stop reason: HOSPADM

## 2021-11-17 RX ORDER — SODIUM CHLORIDE 0.9 % (FLUSH) 0.9 %
5-40 SYRINGE (ML) INJECTION AS NEEDED
Status: DISCONTINUED | OUTPATIENT
Start: 2021-11-17 | End: 2021-11-17 | Stop reason: HOSPADM

## 2021-11-17 RX ORDER — PROPOFOL 10 MG/ML
INJECTION, EMULSION INTRAVENOUS AS NEEDED
Status: DISCONTINUED | OUTPATIENT
Start: 2021-11-17 | End: 2021-11-17 | Stop reason: HOSPADM

## 2021-11-17 RX ORDER — NALOXONE HYDROCHLORIDE 0.4 MG/ML
0.4 INJECTION, SOLUTION INTRAMUSCULAR; INTRAVENOUS; SUBCUTANEOUS
Status: DISCONTINUED | OUTPATIENT
Start: 2021-11-17 | End: 2021-11-17 | Stop reason: HOSPADM

## 2021-11-17 RX ORDER — FLUMAZENIL 0.1 MG/ML
0.2 INJECTION INTRAVENOUS
Status: DISCONTINUED | OUTPATIENT
Start: 2021-11-17 | End: 2021-11-17 | Stop reason: HOSPADM

## 2021-11-17 RX ORDER — LIDOCAINE HYDROCHLORIDE 20 MG/ML
INJECTION, SOLUTION EPIDURAL; INFILTRATION; INTRACAUDAL; PERINEURAL AS NEEDED
Status: DISCONTINUED | OUTPATIENT
Start: 2021-11-17 | End: 2021-11-17 | Stop reason: HOSPADM

## 2021-11-17 RX ORDER — ATROPINE SULFATE 0.1 MG/ML
0.5 INJECTION INTRAVENOUS
Status: DISCONTINUED | OUTPATIENT
Start: 2021-11-17 | End: 2021-11-17 | Stop reason: HOSPADM

## 2021-11-17 RX ORDER — DEXTROMETHORPHAN/PSEUDOEPHED 2.5-7.5/.8
1.2 DROPS ORAL
Status: DISCONTINUED | OUTPATIENT
Start: 2021-11-17 | End: 2021-11-17 | Stop reason: HOSPADM

## 2021-11-17 RX ADMIN — PROPOFOL 50 MG: 10 INJECTION, EMULSION INTRAVENOUS at 12:13

## 2021-11-17 RX ADMIN — SODIUM CHLORIDE 75 ML/HR: 9 INJECTION, SOLUTION INTRAVENOUS at 11:45

## 2021-11-17 RX ADMIN — PROPOFOL 50 MG: 10 INJECTION, EMULSION INTRAVENOUS at 12:18

## 2021-11-17 RX ADMIN — PROPOFOL 50 MG: 10 INJECTION, EMULSION INTRAVENOUS at 12:30

## 2021-11-17 RX ADMIN — PROPOFOL 100 MG: 10 INJECTION, EMULSION INTRAVENOUS at 12:08

## 2021-11-17 RX ADMIN — PROPOFOL 50 MG: 10 INJECTION, EMULSION INTRAVENOUS at 12:24

## 2021-11-17 RX ADMIN — LIDOCAINE HYDROCHLORIDE 80 MG: 20 INJECTION, SOLUTION EPIDURAL; INFILTRATION; INTRACAUDAL; PERINEURAL at 12:08

## 2021-11-17 NOTE — ROUTINE PROCESS
Isacc Mack  1965  [id number]    Situation:  Verbal report received from: Manisha Rollins  Procedure: Procedure(s):  ENDOSCOPIC MUCOSAL RESECTION  COLONOSCOPY  ENDOSCOPIC POLYPECTOMY  INJECTION orise \ ink  RESOLUTION CLIP x 2    Background:    Preoperative diagnosis: Colon adenoma [D12.6]  Postoperative diagnosis: colon polyps, hemorrhoids and diverticulosis    :  Dr. Stacie Hills  Assistant(s): Endoscopy Technician-1: Kyung Canavan  Endoscopy RN-1: Earnest Vázquez RN    Specimens:   ID Type Source Tests Collected by Time Destination   1 : Proximal \ transverse colon polyp Preservative   Velvet Donohue MD 11/17/2021 1224 Pathology   2 : Small transverse colon polyp Preservative   Velvet Donohue MD 11/17/2021 1224 Pathology   3 : Colon, Descending polyp Preservative Colon, Descending  Velvet Donohue MD 11/17/2021 1230 Pathology   4 : Sigmoid polyp Preservative Sigmoid  Velvet Donohue MD 11/17/2021 1236 Pathology     H. Pylori  no    Assessment:  Intra-procedure medications       Anesthesia gave intra-procedure sedation and medications, see anesthesia flow sheet yes    Intravenous fluids: NS@ KVO     Vital signs stable     Abdominal assessment: round and soft     Recommendation:  Discharge patient per MD order. Family   Permission to share finding with family or friend yes    Endoscopy discharge instructions have been reviewed and given to patient. The patient verbalized understanding and acceptance of instructions.

## 2021-11-17 NOTE — ANESTHESIA PREPROCEDURE EVALUATION
Relevant Problems   No relevant active problems       Anesthetic History   No history of anesthetic complications            Review of Systems / Medical History  Patient summary reviewed, nursing notes reviewed and pertinent labs reviewed    Pulmonary          Smoker (former)      Comments: H/O positive PPD   Neuro/Psych   Within defined limits           Cardiovascular              Hyperlipidemia    Exercise tolerance: >4 METS     GI/Hepatic/Renal  Within defined limits              Endo/Other  Within defined limits           Other Findings              Physical Exam    Airway  Mallampati: I  TM Distance: > 6 cm  Neck ROM: normal range of motion   Mouth opening: Normal     Cardiovascular    Rhythm: regular  Rate: normal         Dental  No notable dental hx       Pulmonary  Breath sounds clear to auscultation               Abdominal  GI exam deferred       Other Findings            Anesthetic Plan    ASA: 2  Anesthesia type: MAC          Induction: Intravenous  Anesthetic plan and risks discussed with: Patient

## 2021-11-17 NOTE — PROGRESS NOTES
Endoscope was pre-cleaned at the bedside immediately following procedure by Nathalie Neves ET    Medications     lidocaine (PF) 2% (mg)     Date/Time Rate/Dose/Volume Action Route Admin User Audit       11/17/21  1208 80 mg Given IntraVENous Thiernoene Pacini, CRNA            propofol 10 mg/mL (mg)     Date/Time Rate/Dose/Volume Action Route Admin User Audit       11/17/21  1208 100 mg Given IntraVENous Michaelene Pacini, CRNA       1213 50 mg Given IntraVENous Michaelene Pacini, CRNA       1218 50 mg Given IntraVENous Michaelene Pacini, CRNA       1224 50 mg Given IntraVENous Michaelene Pacini, CRNA       1230 50 mg Given IntraVENous Thiernoene Pacini, CRNA            0.9% sodium chloride infusion (mL)     Date/Time Rate/Dose/Volume Action Route Admin User Audit       11/17/21  1204 75 mL/hr Rate Verify IntraVENous Thiernoene Pacini, CRNA       1234 500 mL Anesthesia Volume Adjustment IntraVENous Thiernoene Pacalbert, CRNA                .

## 2021-11-17 NOTE — PROCEDURES
NAME:  Ramo Brown   :   1965   MRN:   [id number]     Date/Time:  2021 12:38 PM    Colonoscopy Operative Report    Procedure Type:   Colonoscopy with polypectomy (cold snare), Endoscopic mucosal resection     Indications: For therapy of known colon polyps  Pre-operative Diagnosis: see indication above  Post-operative Diagnosis:  See findings below  :  Eddy Glynn MD  Referring Provider: --Bedelia Cover, DO    Exam:  Airway: clear, no airway problems anticipated  Heart: RRR, without gallops or rubs  Lungs: clear bilaterally without wheezes, crackles, or rhonchi  Abdomen: soft, nontender, nondistended, bowel sounds present  Mental Status: awake, alert and oriented to person, place and time    Sedation:  MAC anesthesia Propofol  Procedure Details:  After informed consent was obtained with all risks and benefits of procedure explained and preoperative exam completed, the patient was taken to the endoscopy suite and placed in the left lateral decubitus position. Upon sequential sedation as per above, a digital rectal exam was performed demonstrating internal hemorrhoids. The Olympus videocolonoscope  was inserted in the rectum and carefully advanced to the cecum, which was identified by the ileocecal valve and appendiceal orifice. The quality of preparation was good. The colonoscope was slowly withdrawn with careful evaluation between folds. Retroflexion in the rectum was completed demonstrating internal hemorrhoids. Findings:   1. 25 mm flat, Irena classification 0-IIc, in proximal transverse colon. A prior tattoo was seen distally. An Olympus detachable cap was on tip of colonoscope. 'O'-rise lift followed by en bloc Endoscopic mucosal resection was performed. There was no residual adenoma. Two 'Ulta-clips' were placed with closure of defect. Additional tattoo was placed proximally  2. 3 mm sessile polyp in transverse colon.  Removed by cold snare polypectomy  3. 10 mm sessile polyp in descending colon at approximately 70 cm proximal from anal verge (as described). Removed by cold snare polypectomy  4. 4 mm sessile polyp in Sigmoid colon. Removed by cold snare polypectomy  5. Moderate left-sided diverticulosis  6. Medium sized internal hemorrhoids seen on retroflexion    Specimen Removed:  1. Proximal transverse colon polyp 2. Small transverse colon polyp 3. Descending colon polyp 4. Sigmoid colon polyp  Complications: None. EBL:  None. Impression:   1. 25 mm flat, Irena classification 0-IIc, in proximal transverse colon. A prior tattoo was seen distally. An Olympus detachable cap was on tip of colonoscope. 'O'-rise lift followed by en bloc Endoscopic mucosal resection was performed. There was no residual adenoma. Two 'Ulta-clips' were placed with closure of defect. Additional tattoo was placed proximally  2. 3 mm sessile polyp in transverse colon. Removed by cold snare polypectomy  3. 10 mm sessile polyp in descending colon at approximately 70 cm proximal from anal verge (as described). Removed by cold snare polypectomy  4. 4 mm sessile polyp in Sigmoid colon. Removed by cold snare polypectomy  5. Moderate left-sided diverticulosis  6. Medium sized internal hemorrhoids seen on retroflexion    Recommendations:   1. Follow up pathology  2. Repeat colonoscopy in 6 months for surveillance    Discharge Disposition:  Home in the company of a  when able to ambulate.       Shannon Espinoza MD

## 2021-11-17 NOTE — DISCHARGE INSTRUCTIONS
Ulices Donohue  [id number]  1965    COLON DISCHARGE INSTRUCTIONS  Discomfort:  Redness at IV site- apply warm compress to area; if redness or soreness persist- contact your physician  There may be a slight amount of blood passed from the rectum  Gaseous discomfort- walking, belching will help relieve any discomfort  You may not operate a vehicle for 12 hours  You may not engage in an occupation involving machinery or appliances for rest of today  You may not drink alcoholic beverages for at least 12 hours  Avoid making any critical decisions for at least 24 hour  DIET:   Regular diet. - however -  remember your colon is empty and a heavy meal will produce gas. Avoid these foods:  vegetables, fried / greasy foods, carbonated drinks for today  MEDICATION:  Per Medication Reconciliation       ACTIVITY:  You may not resume your normal daily activities until tomorrow AM; it is recommended that you spend the remainder of the day resting -  avoid any strenuous activity. CALL M.D. ANY SIGN OF:   Increasing pain, nausea, vomiting  Abdominal distension (swelling)  New increased bleeding (oral or rectal)  Fever (chills)  Pain in chest area  Bloody discharge from nose or mouth  Shortness of breath    You may not  take any Advil, Aspirin, Ibuprofen, Motrin, Aleve, or Goodys for 10 days, ONLY  Tylenol as needed for pain. IMPRESSION:  Impression:   1. 25 mm flat, Irena classification 0-IIc, in proximal transverse colon. A prior tattoo was seen distally. An Olympus detachable cap was on tip of colonoscope. 'O'-rise lift followed by en bloc Endoscopic mucosal resection was performed. There was no residual adenoma. Two 'Ulta-clips' were placed with closure of defect. Additional tattoo was placed proximally  2. 3 mm sessile polyp in transverse colon. Removed by cold snare polypectomy  3. 10 mm sessile polyp in descending colon at approximately 70 cm proximal from anal verge (as described).  Removed by cold snare polypectomy  4. 4 mm sessile polyp in Sigmoid colon. Removed by cold snare polypectomy  5. Moderate left-sided diverticulosis  6. Medium sized internal hemorrhoids seen on retroflexion    Recommendations:   1. Follow up pathology  2.  Repeat colonoscopy in 6 months for surveillance    Follow-up Instructions:   Call Dr. Driss Schneider for the results of procedure / biopsy in 7-10 days  Telephone # [phone]    Cathy Gray MD

## 2021-11-17 NOTE — ANESTHESIA POSTPROCEDURE EVALUATION
Procedure(s):  ENDOSCOPIC MUCOSAL RESECTION  COLONOSCOPY  ENDOSCOPIC POLYPECTOMY  INJECTION orise \ ink  RESOLUTION CLIP x 2. MAC    Anesthesia Post Evaluation        Patient location during evaluation: PACU  Note status: Adequate. Level of consciousness: responsive to verbal stimuli and sleepy but conscious  Pain management: satisfactory to patient  Airway patency: patent  Anesthetic complications: no  Cardiovascular status: acceptable  Respiratory status: acceptable  Hydration status: acceptable  Comments: +Post-Anesthesia Evaluation and Assessment    Patient: Isabel Winslow MRN: [id number]  SSN: [id number]   YOB: 1965  Age: 55 y.o. Sex: male      Cardiovascular Function/Vital Signs    /70   Pulse 84   Temp 36.3 °C (97.4 °F)   Resp 16   Ht 6' 2\" (1.88 m)   Wt 100.4 kg (221 lb 6.4 oz)   SpO2 100%   BMI 28.43 kg/m²     Patient is status post Procedure(s):  ENDOSCOPIC MUCOSAL RESECTION  COLONOSCOPY  ENDOSCOPIC POLYPECTOMY  INJECTION orise \ ink  RESOLUTION CLIP x 2. Nausea/Vomiting: Controlled. Postoperative hydration reviewed and adequate. Pain:  Pain Scale 1: Visual (11/17/21 1240)  Pain Intensity 1: 0 (11/17/21 1240)   Managed. Neurological Status: At baseline. Mental Status and Level of Consciousness: Arousable. Pulmonary Status:   O2 Device: None (Room air) (11/17/21 1240)   Adequate oxygenation and airway patent. Complications related to anesthesia: None    Post-anesthesia assessment completed. No concerns. Signed By: Mariano Martinez MD    11/17/2021  Post anesthesia nausea and vomiting:  controlled      INITIAL Post-op Vital signs: No vitals data found for the desired time range.

## 2021-11-17 NOTE — H&P
Gastroenterology Outpatient History and Physical    Patient: Zeb Ramal    Physician: Junie Ibrahim MD    Chief Complaint: Known colon polyps  History of Present Illness: No GI complaints    History:  Past Medical History:   Diagnosis Date    Fatigue     Hyperlipidemia     Onychomycosis of toenail     PPD positive, treated     INH for 9 months      Past Surgical History:   Procedure Laterality Date    COLONOSCOPY N/A 2021    COLONOSCOPY WITH COLD FORCEP POLYPECTOMIES X 6  AND PROXIMAL TRANSVERSE TATOOING AND COLD FORCEP BIOPSIES X 2. performed by Key Schulz [facility] at [facility] HX COLONOSCOPY  2021    polyps x5    HX HEENT Bilateral 2006    lasik    HX KNEE ARTHROSCOPY Left       Social History     Socioeconomic History    Marital status:     Number of children: 2   Occupational History    Occupation:    Tobacco Use    Smoking status: Former Smoker     Quit date: 2019     Years since quittin.8    Smokeless tobacco: Current User    Tobacco comment: Vapes. Vaping Use    Vaping Use: Every day    Substances: Nicotine   Substance and Sexual Activity    Alcohol use: Yes     Comment: Social    Drug use: Never      Family History   Problem Relation Age of Onset    Diabetes Mother     Hypertension Mother    [facility] Arthritis-rheumatoid Father     Cancer Father         Lung    Hypertension Father       Patient Active Problem List   Diagnosis Code    ACP (advance care planning) Z71.89    Encounter for screening for lung cancer Z12.2    Other fatigue R53.83    Nocturia R35.1       Allergies: No Known Allergies  Medications:   Prior to Admission medications    Medication Sig Start Date End Date Taking? Authorizing Provider   naproxen sodium (ALEVE) 220 mg tablet Take 220 mg by mouth two (2) times daily (with meals). Provider, Historical   CYCLOBENZAPRINE HCL (FLEXERIL PO) Take 10 mg by mouth three (3) times daily as needed.     Provider, Historical Physical Exam:   Vital Signs: Blood pressure (!) 123/96, pulse 95, temperature 97.4 °F (36.3 °C), resp. rate 18, height 6' 2\" (1.88 m), weight 100.4 kg (221 lb 6.4 oz), SpO2 99 %.   General: well developed, well nourished   HEENT: unremarkable   Heart: regular rhythm no mumur    Lungs: clear   Abdominal:  benign   Neurological: unremarkable   Extremities: no edema     Findings/Diagnosis: Known colon polyps  Plan of Care/Planned Procedure: Colonoscopy w/ EMR with conscious/deep sedation    Signed:  Rocio Seymour MD 11/17/2021

## 2022-03-18 PROBLEM — Z71.89 ACP (ADVANCE CARE PLANNING): Status: ACTIVE | Noted: 2017-02-15

## 2022-03-18 PROBLEM — R35.1 NOCTURIA: Status: ACTIVE | Noted: 2021-03-05

## 2022-03-18 PROBLEM — Z12.2 ENCOUNTER FOR SCREENING FOR LUNG CANCER: Status: ACTIVE | Noted: 2021-03-05

## 2022-03-19 PROBLEM — R53.83 OTHER FATIGUE: Status: ACTIVE | Noted: 2021-03-05

## 2022-03-22 ENCOUNTER — OFFICE VISIT (OUTPATIENT)
Dept: FAMILY MEDICINE CLINIC | Age: 57
End: 2022-03-22
Payer: COMMERCIAL

## 2022-03-22 VITALS
RESPIRATION RATE: 18 BRPM | SYSTOLIC BLOOD PRESSURE: 130 MMHG | OXYGEN SATURATION: 98 % | WEIGHT: 231 LBS | TEMPERATURE: 96.8 F | BODY MASS INDEX: 29.65 KG/M2 | DIASTOLIC BLOOD PRESSURE: 86 MMHG | HEIGHT: 74 IN | HEART RATE: 71 BPM

## 2022-03-22 DIAGNOSIS — T14.8XXA BRUISING: ICD-10-CM

## 2022-03-22 DIAGNOSIS — R53.83 OTHER FATIGUE: ICD-10-CM

## 2022-03-22 DIAGNOSIS — Z00.00 WELL ADULT EXAM: Primary | ICD-10-CM

## 2022-03-22 DIAGNOSIS — N40.1 BENIGN PROSTATIC HYPERPLASIA WITH NOCTURIA: ICD-10-CM

## 2022-03-22 DIAGNOSIS — M54.50 CHRONIC MIDLINE LOW BACK PAIN WITHOUT SCIATICA: ICD-10-CM

## 2022-03-22 DIAGNOSIS — R35.1 BENIGN PROSTATIC HYPERPLASIA WITH NOCTURIA: ICD-10-CM

## 2022-03-22 DIAGNOSIS — G89.29 CHRONIC MIDLINE LOW BACK PAIN WITHOUT SCIATICA: ICD-10-CM

## 2022-03-22 DIAGNOSIS — Z13.220 SCREENING, LIPID: ICD-10-CM

## 2022-03-22 DIAGNOSIS — Z12.5 SCREENING FOR PROSTATE CANCER: ICD-10-CM

## 2022-03-22 DIAGNOSIS — R35.1 NOCTURIA: ICD-10-CM

## 2022-03-22 PROCEDURE — 99396 PREV VISIT EST AGE 40-64: CPT | Performed by: NURSE PRACTITIONER

## 2022-03-22 RX ORDER — CYCLOBENZAPRINE HCL 10 MG
10 TABLET ORAL
Qty: 30 TABLET | Refills: 2 | Status: SHIPPED | OUTPATIENT
Start: 2022-03-22

## 2022-03-22 RX ORDER — TRIAMCINOLONE ACETONIDE 1 MG/G
CREAM TOPICAL 2 TIMES DAILY
Qty: 15 G | Refills: 0 | Status: SHIPPED | OUTPATIENT
Start: 2022-03-22

## 2022-03-22 RX ORDER — NAPROXEN SODIUM 220 MG
220 TABLET ORAL 2 TIMES DAILY WITH MEALS
Qty: 60 TABLET | Refills: 3 | Status: SHIPPED | OUTPATIENT
Start: 2022-03-22

## 2022-03-22 NOTE — PROGRESS NOTES
1. \"Have you been to the ER, urgent care clinic since your last visit? Hospitalized since your last visit? \" No    2. \"Have you seen or consulted any other health care providers outside of the [address] [place] [address] since your last visit? \" No     3. For patients aged 39-70: Has the patient had a colonoscopy / FIT/ Cologuard? Yes - no Care Gap present      If the patient is female:    4. For patients aged 41-77: Has the patient had a mammogram within the past 2 years? NA - based on age or sex      11. For patients aged 21-65: Has the patient had a pap smear?  NA - based on age or sex      Chief Complaint   Patient presents with    Physical         Visit Vitals  /86 (BP 1 Location: Right upper arm, BP Patient Position: Sitting, BP Cuff Size: Adult)   Pulse 71   Temp 96.8 °F (36 °C) (Temporal)   Resp 18   Ht 6' 2\" (1.88 m)   Wt 231 lb (104.8 kg)   SpO2 98%   BMI 29.66 kg/m²       Pain Scale: 4/10  Pain Location: Back (low)

## 2022-03-23 LAB
APTT PPP: 29 SEC (ref 24–33)
INR PPP: 1 (ref 0.9–1.2)
PROTHROMBIN TIME: 10.1 SEC (ref 9.1–12)
PSA SERPL-MCNC: 1.3 NG/ML (ref 0–4)

## 2022-03-27 NOTE — PROGRESS NOTES
Subjective:     Chris Stevens is a 55 y.o. male who presents today with the following:  Chief Complaint   Patient presents with    Physical       Patient Active Problem List   Diagnosis Code    ACP (advance care planning) Z71.89    Encounter for screening for lung cancer Z12.2    Other fatigue R53.83    Nocturia R35.1         COMPLIANT WITH MEDICATION:   Physical (well adult); Denies chest pain, dyspnea, palpitations, headache and blurred vision. Blood pressure normotensive. Bruising easily lately - we discussed NSAIDS can increase bleeding/bruising . Discussed checking PT ,PTT and INR. He is receptive. Eczema ; Requesting a refill for Kenalog cream f flare upor eczema . We discussed eczema care     Back pain intermittent since [facility]. Increase since gaining 20lbs . Weight gain ocured after smoking cessation. depression screening addressed not at risk    abuse screening addressed denies    learning assessment addressed reviewed nurses notes    fall risk addressed not at risk    HM: addressed reminded to have COVID vaccines. ROS:  Gen: denies fever, chills, fatigue, weight loss, weight gain  HEENT:denies blurry vision, nasal congestion, sore throat  Resp: denies dypsnea, cough, wheezing  CV: denies chest pain radiating to the jaws or arms, palpitations, lower extremity edema  Abd: denies nausea, vomiting, diarrhea, constipation  Neuro: denies numbness/tingling  Endo: denies polyuria, polydipsia, heat/cold intolerance  Heme: no lymphadenopathy    No Known Allergies      Current Outpatient Medications:     naproxen sodium (Aleve) 220 mg tablet, Take 1 Tablet by mouth two (2) times daily (with meals). , Disp: 60 Tablet, Rfl: 3    cyclobenzaprine (FLEXERIL) 10 mg tablet, Take 1 Tablet by mouth three (3) times daily as needed for Muscle Spasm(s). , Disp: 30 Tablet, Rfl: 2    triamcinolone acetonide (KENALOG) 0.1 % topical cream, Apply  to affected area two (2) times a day.  use thin layer, Disp: 15 g, Rfl: 0    Past Medical History:   Diagnosis Date    Fatigue     Hyperlipidemia     Onychomycosis of toenail     PPD positive, treated     INH for 9 months       Past Surgical History:   Procedure Laterality Date    COLONOSCOPY N/A 6/17/2021    COLONOSCOPY WITH COLD FORCEP POLYPECTOMIES X 6  AND PROXIMAL TRANSVERSE TATOOING AND COLD FORCEP BIOPSIES X 2. performed by Dorys Parham MD at [facility] COLONOSCOPY N/A 11/17/2021    COLONOSCOPY performed by Juan M [facility] at [facility] ENDOSCOPY    HX COLONOSCOPY  06/21/2021    polyps x5    HX HEENT Bilateral 2006    lasik    HX KNEE ARTHROSCOPY Left        Social History     Tobacco Use   Smoking Status Former Smoker    Quit date: 01/2019    Years since quitting: 3.2   Smokeless Tobacco Current User   Tobacco Comment    Vapes. Social History     Socioeconomic History    Marital status:     Number of children: 2   Occupational History    Occupation:    Tobacco Use    Smoking status: Former Smoker     Quit date: 01/2019     Years since quitting: 3.2    Smokeless tobacco: Current User    Tobacco comment: Vapes. Vaping Use    Vaping Use: Every day    Substances: Nicotine   Substance and Sexual Activity    Alcohol use: Yes     Comment: Social    Drug use: Never       Family History   Problem Relation Age of Onset    Diabetes Mother     Hypertension Mother    [employer] Arthritis-rheumatoid Father     Cancer Father         Lung    Hypertension Father          Objective:     Visit Vitals  /86 (BP 1 Location: Right upper arm, BP Patient Position: Sitting, BP Cuff Size: Adult)   Pulse 71   Temp 96.8 °F (36 °C) (Temporal)   Resp 18   Ht 6' 2\" (1.88 m)   Wt 231 lb (104.8 kg)   SpO2 98%   BMI 29.66 kg/m²     Body mass index is 29.66 kg/m². General: Alert and oriented. No acute distress. Well nourished  HEENT :  Ears:TMs are normal. Canals are clear.    Eyes: pupils equal, round, react to light and accommodation. Extra ocular movements intact. Nose: patent. Mouth and throat is clear. Neck:supple full range of motion no thyromegaly. Trachea midline, No carotid bruits. No significant lymphadenopathy  Lungs[de-identified] clear to auscultation without wheezes, rales, or rhonchi. Heart :RRR, S1 & S2 are normal intensity. No murmur; no gallop  Abdomen: bowel sounds active. No tenderness, guarding, rebound, masses, hepatic or spleen enlargement  Back: no CVA tenderness. Extremities: without clubbing, cyanosis, or edema  Pulses: radial and femoral pulses are normal  Neuro: HMF intact. Cranial nerves II through XII grossly normal.  Motor: is 5 over 5 and symmetrical.   Deep tendon reflexes: +2 equal  Psych:appropriate behavior, mood, affect and judgement. Results for orders placed or performed in visit on 03/22/22   PROTHROMBIN TIME + INR   Result Value Ref Range    INR 1.0 0.9 - 1.2    Prothrombin time 10.1 9.1 - 12.0 sec   PTT   Result Value Ref Range    aPTT 29 24 - 33 sec   PSA, DIAGNOSTIC (PROSTATE SPECIFIC AG)   Result Value Ref Range    Prostate Specific Ag 1.3 0.0 - 4.0 ng/mL       Results for orders placed or performed in visit on 03/22/22   PROTHROMBIN TIME + INR   Result Value Ref Range    INR 1.0 0.9 - 1.2    Prothrombin time 10.1 9.1 - 12.0 sec    Narrative    Performed at:  [address]  [id number]  : Carly Zhang MD, Phone:  [phone]   PTT   Result Value Ref Range    aPTT 29 24 - 33 sec    Narrative    Performed at:  [address]  [id number]  : Carly Zhang MD, Phone:  [phone]   PSA, DIAGNOSTIC (PROSTATE SPECIFIC AG)   Result Value Ref Range    Prostate Specific Ag 1.3 0.0 - 4.0 ng/mL    Narrative    Performed at:  [address]  [id number]  : Carly Zhang MD, Phone:  [phone]       Assessment/ Plan:     1.  Screening, lipid  Reviewed labs from 3/5/2022 . Las within normal limits . Continue heart healthy diet and execise 150 minutes per week. 2. Benign prostatic hyperplasia with nocturia    PSA within normal limits . No change to medical management   3. Nocturia  Check PSA within normal limits     4. Other fatigue  Labs reviewed from 3/5/2022  5. Well adult exam  Notes above. 6. Screening for prostate cancer    - PSA, DIAGNOSTIC (PROSTATE SPECIFIC AG); Future  - PSA, DIAGNOSTIC (PROSTATE SPECIFIC AG)    7. Chronic midline low back pain without sciatica  Renew naproxen as noted above . Flexeril for muscle spasms. 8. Bruising  - PROTHROMBIN TIME + INR; Future  - PTT; Future  - PROTHROMBIN TIME + INR  - PTT      Orders Placed This Encounter    PROSTATE SPECIFIC AG     Standing Status:   Future     Number of Occurrences:   1     Standing Expiration Date:   4/22/2022    PROTHROMBIN TIME + INR     Standing Status:   Future     Number of Occurrences:   1     Standing Expiration Date:   4/22/2022    PTT     Standing Status:   Future     Number of Occurrences:   1     Standing Expiration Date:   3/22/2023    PROSTATE SPECIFIC AG    naproxen sodium (Aleve) 220 mg tablet     Sig: Take 1 Tablet by mouth two (2) times daily (with meals). Dispense:  60 Tablet     Refill:  3    cyclobenzaprine (FLEXERIL) 10 mg tablet     Sig: Take 1 Tablet by mouth three (3) times daily as needed for Muscle Spasm(s). Dispense:  30 Tablet     Refill:  2    triamcinolone acetonide (KENALOG) 0.1 % topical cream     Sig: Apply  to affected area two (2) times a day. use thin layer     Dispense:  15 g     Refill:  0         Verbal and written instructions (see AVS) provided. Patient expresses understanding of diagnosis and treatment plan.     Health Maintenance Due   Topic Date Due    COVID-19 Vaccine (1) 05/[birth year]/1971               OTILIA Junior

## 2022-03-27 NOTE — ACP (ADVANCE CARE PLANNING)
Discussed importance of advanced medical directives with patient. Patient is capable of making decisions.   Autumn Sicard NP-C

## 2023-05-18 RX ORDER — CYCLOBENZAPRINE HCL 10 MG
10 TABLET ORAL 3 TIMES DAILY PRN
COMMUNITY
Start: 2022-03-22

## 2023-05-18 RX ORDER — NAPROXEN SODIUM 220 MG
220 TABLET ORAL 2 TIMES DAILY WITH MEALS
COMMUNITY
Start: 2022-03-22

## 2023-05-18 RX ORDER — TRIAMCINOLONE ACETONIDE 1 MG/G
CREAM TOPICAL 2 TIMES DAILY
COMMUNITY
Start: 2022-03-22

## 2024-03-07 ENCOUNTER — ANESTHESIA EVENT (OUTPATIENT)
Facility: HOSPITAL | Age: 59
End: 2024-03-07
Payer: COMMERCIAL

## 2024-03-07 RX ORDER — PHENTERMINE HYDROCHLORIDE 37.5 MG/1
37.5 CAPSULE ORAL EVERY MORNING
COMMUNITY

## 2024-03-07 NOTE — ANESTHESIA PRE PROCEDURE
CALCIUM 9.7 03/05/2021 12:00 AM    BILITOT 0.5 03/05/2021 12:00 AM    ALKPHOS 73 03/05/2021 12:00 AM    AST 16 03/05/2021 12:00 AM    ALT 17 03/05/2021 12:00 AM       POC Tests: No results for input(s): \"POCGLU\", \"POCNA\", \"POCK\", \"POCCL\", \"POCBUN\", \"POCHEMO\", \"POCHCT\" in the last 72 hours.    Coags:   Lab Results   Component Value Date/Time    PROTIME 10.1 03/22/2022 12:00 AM    INR 1.0 03/22/2022 12:00 AM    APTT 29 03/22/2022 12:00 AM       HCG (If Applicable): No results found for: \"PREGTESTUR\", \"PREGSERUM\", \"HCG\", \"HCGQUANT\"     ABGs: No results found for: \"PHART\", \"PO2ART\", \"EXR2OWC\", \"NTZ7FUW\", \"BEART\", \"B3SUZXCQ\"     Type & Screen (If Applicable):  No results found for: \"LABABO\", \"LABRH\"    Drug/Infectious Status (If Applicable):  Lab Results   Component Value Date/Time    HEPCAB <0.1 02/15/2017 09:46 AM       COVID-19 Screening (If Applicable):   Lab Results   Component Value Date/Time    COVID19 Not detected 11/12/2021 06:18 AM           Anesthesia Evaluation  Patient summary reviewed and Nursing notes reviewed  Airway: Mallampati: I  TM distance: >3 FB   Neck ROM: full  Mouth opening: > = 3 FB   Dental: normal exam         Pulmonary:normal exam                              ROS comment: Former smoker - Quit 2019    Hx PPD positive, treated (INH for 9 months)   Cardiovascular:                   ROS comment: No ECG on file     Neuro/Psych:   Negative Neuro/Psych ROS              GI/Hepatic/Renal: Neg GI/Hepatic/Renal ROS            Endo/Other: Negative Endo/Other ROS                    Abdominal: normal exam            Vascular: negative vascular ROS.         Other Findings:       Anesthesia Plan      MAC     ASA 2       Induction: intravenous.      Anesthetic plan and risks discussed with patient.      Plan discussed with CRNA.                Chuck Chowdary MD   3/7/2024

## 2024-03-08 ENCOUNTER — HOSPITAL ENCOUNTER (OUTPATIENT)
Facility: HOSPITAL | Age: 59
Setting detail: OUTPATIENT SURGERY
Discharge: HOME | End: 2024-03-08
Attending: INTERNAL MEDICINE | Admitting: INTERNAL MEDICINE
Payer: COMMERCIAL

## 2024-03-08 ENCOUNTER — ANESTHESIA (OUTPATIENT)
Facility: HOSPITAL | Age: 59
End: 2024-03-08
Payer: COMMERCIAL

## 2024-03-08 VITALS
OXYGEN SATURATION: 98 % | RESPIRATION RATE: 18 BRPM | BODY MASS INDEX: 28.84 KG/M2 | TEMPERATURE: 97.4 F | SYSTOLIC BLOOD PRESSURE: 127 MMHG | HEIGHT: 74 IN | DIASTOLIC BLOOD PRESSURE: 97 MMHG | HEART RATE: 81 BPM | WEIGHT: 224.7 LBS

## 2024-03-08 PROCEDURE — 2500000003 HC RX 250 WO HCPCS: Performed by: NURSE ANESTHETIST, CERTIFIED REGISTERED

## 2024-03-08 PROCEDURE — 6360000002 HC RX W HCPCS: Performed by: INTERNAL MEDICINE

## 2024-03-08 PROCEDURE — 6360000002 HC RX W HCPCS: Performed by: NURSE ANESTHETIST, CERTIFIED REGISTERED

## 2024-03-08 PROCEDURE — 2580000003 HC RX 258: Performed by: INTERNAL MEDICINE

## 2024-03-08 PROCEDURE — 3600007502: Performed by: INTERNAL MEDICINE

## 2024-03-08 PROCEDURE — 2709999900 HC NON-CHARGEABLE SUPPLY: Performed by: INTERNAL MEDICINE

## 2024-03-08 PROCEDURE — 3700000000 HC ANESTHESIA ATTENDED CARE: Performed by: INTERNAL MEDICINE

## 2024-03-08 PROCEDURE — 88305 TISSUE EXAM BY PATHOLOGIST: CPT

## 2024-03-08 PROCEDURE — 7100000010 HC PHASE II RECOVERY - FIRST 15 MIN: Performed by: INTERNAL MEDICINE

## 2024-03-08 PROCEDURE — 3600007512: Performed by: INTERNAL MEDICINE

## 2024-03-08 PROCEDURE — 3700000001 HC ADD 15 MINUTES (ANESTHESIA): Performed by: INTERNAL MEDICINE

## 2024-03-08 PROCEDURE — 2720000010 HC SURG SUPPLY STERILE: Performed by: INTERNAL MEDICINE

## 2024-03-08 DEVICE — CLIP
Type: IMPLANTABLE DEVICE | Site: DESCENDING COLON | Status: FUNCTIONAL
Brand: RESOLUTION 360™ ULTRA CLIP

## 2024-03-08 DEVICE — CLIP
Type: IMPLANTABLE DEVICE | Site: DESCENDING COLON | Status: FUNCTIONAL
Brand: MANTIS™ CLIP

## 2024-03-08 RX ORDER — SODIUM CHLORIDE 9 MG/ML
25 INJECTION, SOLUTION INTRAVENOUS PRN
Status: DISCONTINUED | OUTPATIENT
Start: 2024-03-08 | End: 2024-03-08 | Stop reason: HOSPADM

## 2024-03-08 RX ORDER — SODIUM CHLORIDE 0.9 % (FLUSH) 0.9 %
5-40 SYRINGE (ML) INJECTION EVERY 12 HOURS SCHEDULED
Status: DISCONTINUED | OUTPATIENT
Start: 2024-03-08 | End: 2024-03-08 | Stop reason: HOSPADM

## 2024-03-08 RX ORDER — DEXMEDETOMIDINE HYDROCHLORIDE 100 UG/ML
INJECTION, SOLUTION INTRAVENOUS PRN
Status: DISCONTINUED | OUTPATIENT
Start: 2024-03-08 | End: 2024-03-08 | Stop reason: SDUPTHER

## 2024-03-08 RX ORDER — LIDOCAINE HYDROCHLORIDE 20 MG/ML
INJECTION, SOLUTION EPIDURAL; INFILTRATION; INTRACAUDAL; PERINEURAL PRN
Status: DISCONTINUED | OUTPATIENT
Start: 2024-03-08 | End: 2024-03-08 | Stop reason: SDUPTHER

## 2024-03-08 RX ORDER — SODIUM CHLORIDE 0.9 % (FLUSH) 0.9 %
5-40 SYRINGE (ML) INJECTION PRN
Status: DISCONTINUED | OUTPATIENT
Start: 2024-03-08 | End: 2024-03-08 | Stop reason: HOSPADM

## 2024-03-08 RX ADMIN — PROPOFOL 150 MCG/KG/MIN: 10 INJECTION, EMULSION INTRAVENOUS at 08:30

## 2024-03-08 RX ADMIN — SODIUM CHLORIDE 25 ML: 9 INJECTION, SOLUTION INTRAVENOUS at 08:02

## 2024-03-08 RX ADMIN — METHYLENE BLUE 50 MG: 5 INJECTION INTRAVENOUS at 09:16

## 2024-03-08 RX ADMIN — DEXMEDETOMIDINE HYDROCHLORIDE 10 MCG: 100 INJECTION, SOLUTION, CONCENTRATE INTRAVENOUS at 08:29

## 2024-03-08 RX ADMIN — PROPOFOL 50 MG: 10 INJECTION, EMULSION INTRAVENOUS at 08:29

## 2024-03-08 RX ADMIN — LIDOCAINE HYDROCHLORIDE 100 MG: 20 INJECTION, SOLUTION EPIDURAL; INFILTRATION; INTRACAUDAL; PERINEURAL at 08:29

## 2024-03-08 ASSESSMENT — PAIN - FUNCTIONAL ASSESSMENT: PAIN_FUNCTIONAL_ASSESSMENT: 0-10

## 2024-03-08 NOTE — PERIOP NOTE
ARRIVAL INFORMATION:  Verified patient name and date of birth, scheduled procedure, and informed consent.     : Mariza singh contact number: [phone]  Physician and staff can share information with the .     Belongings with patient include:  Clothing,    GI FOCUSED ASSESSMENT:  Neuro: Awake, alert, oriented x4  Respiratory: even and unlabored   GI: soft and non-distended  EKG Rhythm: normal sinus rhythm    Education:Reviewed general discharge instructions and  information.

## 2024-03-08 NOTE — ANESTHESIA POSTPROCEDURE EVALUATION
Department of Anesthesiology  Postprocedure Note    Patient: James Minaya  MRN: [id number]  YOB: 1965  Date of evaluation: 3/8/2024    Procedure Summary       Date: 03/08/24 Room / Location: [facility] 04 / [facility] ENDOSCOPY    Anesthesia Start: 0829 Anesthesia Stop: 0937    Procedure: COLONOSCOPY WITH ENDOSCOPIC MUCOSAL RESECTION (Lower GI Region) Diagnosis:       Colon adenoma      (Colon adenoma [D12.6])    Surgeons: Franco Schultz MD Responsible Provider: Chuck Chowdary MD    Anesthesia Type: TIVA ASA Status: 2            Anesthesia Type: TIVA    Ashish Phase I: Ashish Score: 10    Ashish Phase II:      Anesthesia Post Evaluation    Patient location during evaluation: PACU  Patient participation: complete - patient participated  Level of consciousness: sleepy but conscious and responsive to verbal stimuli  Pain score: 0  Airway patency: patent  Nausea & Vomiting: no vomiting and no nausea  Cardiovascular status: blood pressure returned to baseline and hemodynamically stable  Respiratory status: acceptable  Hydration status: stable  Pain management: adequate and satisfactory to patient    No notable events documented.

## 2024-03-08 NOTE — H&P
Gastroenterology Outpatient History and Physical    Patient: James AU Rei    Physician: Franco Schultz MD    Chief Complaint: Large colon polyp  History of Present Illness: No GI complaints    History:  Past Medical History:   Diagnosis Date    Hyperlipidemia     PPD positive, treated     INH for 9 months      Past Surgical History:   Procedure Laterality Date    COLONOSCOPY N/A 2021    COLONOSCOPY performed by Franco Schultz MD at [facility] ENDOSCOPY    COLONOSCOPY  2021    polyps x5    COLONOSCOPY N/A 2021    COLONOSCOPY WITH COLD FORCEP POLYPECTOMIES X 6  AND PROXIMAL TRANSVERSE TATOOING AND COLD FORCEP BIOPSIES X 2. performed by Stephanie Bone MD at [facility] MAIN OR    HEENT Bilateral 2006    lasik    KNEE ARTHROSCOPY Left       Social History     Socioeconomic History    Marital status:      Spouse name: None    Number of children: None    Years of education: None    Highest education level: None   Tobacco Use    Smoking status: Former     Current packs/day: 0.00     Types: Cigarettes     Quit date: 2019     Years since quittin.1    Smokeless tobacco: Never    Tobacco comments:     Quit smoking: Vapes.   Vaping Use    Vaping Use: Never used   Substance and Sexual Activity    Alcohol use: Not Currently    Drug use: Never      Family History   Problem Relation Age of Onset    Hypertension Mother     Diabetes Mother     Cancer Father         Lung    Hypertension Father     Rheum Arthritis Father     Colon Polyps Father       Patient Active Problem List   Diagnosis    Nocturia    Encounter for screening for lung cancer    ACP (advance care planning)    Other fatigue       Allergies: No Known Allergies  Medications:   Prior to Admission medications    Medication Sig Start Date End Date Taking? Authorizing Provider   phentermine 37.5 MG capsule Take 1 capsule by mouth every morning.   Yes Provider, MD Zaina   cyclobenzaprine (FLEXERIL) 10 MG tablet Take 1 tablet by mouth 3  times daily as needed 3/22/22   Automatic Reconciliation, Ar   naproxen sodium (ANAPROX) 220 MG tablet Take 1 tablet by mouth 2 times daily as needed 3/22/22   Automatic Reconciliation, Ar   triamcinolone (KENALOG) 0.1 % cream Apply topically 2 times daily 3/22/22   Automatic Reconciliation, Ar     Physical Exam:   Vital Signs: Blood pressure (!) 131/94, pulse 74, temperature 98.1 °F (36.7 °C), temperature source Temporal, resp. rate 19, height 1.88 m (6' 2\"), weight 101.9 kg (224 lb 11.2 oz), SpO2 97 %.  General: well developed, well nourished   HEENT: unremarkable   Heart: regular rhythm no mumur    Lungs: clear   Abdominal:  benign   Neurological: unremarkable   Extremities: no edema     Findings/Diagnosis: Large colon polyp  Plan of Care/Planned Procedure: Colonoscopy w/ EMR with conscious/deep sedation    Signed:  Franco Schultz MD 3/8/2024

## 2024-03-08 NOTE — OP NOTE
NAME:  James Minaya   :   1965   MRN:   [id number]     Date/Time:  3/8/2024 9:36 AM    Colonoscopy Operative Report    Procedure Type:   Colonoscopy with Endoscopic mucosal resection, APC     Indications:     For therapy of known large colon adenoma  Pre-operative Diagnosis: see indication above  Post-operative Diagnosis:  See findings below  :  Franco Schultz MD  Referring Provider: --Kathy Ziegler, APRN - NP    Exam:  Airway: clear, no airway problems anticipated  Heart: RRR, without gallops or rubs  Lungs: clear bilaterally without wheezes, crackles, or rhonchi  Abdomen: soft, nontender, nondistended, bowel sounds present  Mental Status: awake, alert and oriented to person, place and time    Sedation:  MAC anesthesia Propofol  Procedure Details:  After informed consent was obtained with all risks and benefits of procedure explained and preoperative exam completed, the patient was taken to the endoscopy suite and placed in the left lateral decubitus position.  Upon sequential sedation as per above, a digital rectal exam was performed demonstrating internal hemorrhoids.  The Olympus videocolonoscope  was inserted in the rectum and carefully advanced to the cecum, which was identified by the ileocecal valve and appendiceal orifice.   The quality of preparation was good.  The colonoscope was slowly withdrawn with careful evaluation between folds.     Findings:   1. 50 mm flat/depressed, Nithya classification 0IIb/c, in descending colon at 45 cm proximal from anal verge. Large tattoo seen both proximally and distally. Preparations were made endoscopic mucosal resection with an Olympus distal attachment cap placed on tip of colonoscope. 'Endoclot SIS' mixed with methylene blue was injected submucosally and achieved excellent 'lift'. Subsequently piecemeal endoscopic mucosal resection was achieved with a combination of a 25 mm and 15 mm 'Duckbill' snare. The resection margin was closely examined  with both white light and NBI and there was no evidence of residual adenoma. The resection margin was ablated with APC. Complete closure of defect was achieved with a combination of a 'Mantis' closure device and an 'Ultra' clip  2. Prior tattoo seen in transverse colon with no evidence of residual/recurrent adenoma  3. Small internal hemorrhoids seen on retroflexion  4. Otherwise normal colonoscopy through to the cecum    Specimen Removed:  1. Descending colon polyp at 45 cm  Complications: None.   EBL:  None.    Impression:    50 mm flat/depressed, Nithya classification 0IIb/c, in descending colon at 45 cm proximal from anal verge. Large tattoo seen both proximally and distally. Preparations were made endoscopic mucosal resection with an Olympus distal attachment cap placed on tip of colonoscope. 'Endoclot SIS' mixed with methylene blue was injected submucosally and achieved excellent 'lift'. Subsequently piecemeal endoscopic mucosal resection was achieved with a combination of a 25 mm and 15 mm 'Duckbill' snare. The resection margin was closely examined with both white light and NBI and there was no evidence of residual adenoma. The resection margin was ablated with APC. Complete closure of defect was achieved with a combination of a 'Mantis' closure device and an 'Ultra' clip  Prior tattoo seen in transverse colon with no evidence of residual/recurrent adenoma  Small internal hemorrhoids seen on retroflexion  Otherwise normal colonoscopy through to the cecum    Recommendations:   Follow up pathology  Patient needs genetic testing RE: multiple advanced adenomas  Repeat colonoscopy in 6 months (to be done at hospital)    Discharge Disposition:  Home in the company of a  when able to ambulate.      Franco Schultz MD

## 2024-03-08 NOTE — DISCHARGE INSTRUCTIONS
Jameshai Minaya  [id number]  1965    COLON DISCHARGE INSTRUCTIONS  Discomfort:  Redness at IV site- apply warm compress to area; if redness or soreness persist- contact your physician  There may be a slight amount of blood passed from the rectum  Gaseous discomfort- walking, belching will help relieve any discomfort  You may not operate a vehicle for 12 hours  You may not engage in an occupation involving machinery or appliances for rest of today  You may not drink alcoholic beverages for at least 12 hours  Avoid making any critical decisions for at least 24 hour  DIET:   Clear liquid diet today, advance to regular diet tomorrow    MEDICATION:  [unfilled]     ACTIVITY:  You may not resume your normal daily activities until tomorrow AM; it is recommended that you spend the remainder of the day resting -  avoid any strenuous activity.  CALL M.D.  ANY SIGN OF:   Increasing pain, nausea, vomiting  Abdominal distension (swelling)  New increased bleeding (oral or rectal)  Fever (chills)  Pain in chest area  Bloody discharge from nose or mouth  Shortness of breath    You may not  take any Advil, Aspirin, Ibuprofen, Motrin, Aleve, or Goody’s for 10 days, ONLY  Tylenol as needed for pain.    IMPRESSION:  50 mm flat/depressed, Nithya classification 0IIb/c, in descending colon at 45 cm proximal from anal verge. Large tattoo seen both proximally and distally. Preparations were made endoscopic mucosal resection with an Olympus distal attachment cap placed on tip of colonoscope. 'Endoclot SIS' mixed with methylene blue was injected submucosally and achieved excellent 'lift'. Subsequently piecemeal endoscopic mucosal resection was achieved with a combination of a 25 mm and 15 mm 'Duckbill' snare. The resection margin was closely examined with both white light and NBI and there was no evidence of residual adenoma. The resection margin was ablated with APC. Complete closure of defect was achieved with a combination of a

## 2024-03-08 NOTE — PERIOP NOTE
Endoscopy Case End Note:    ***:  Procedure scope was pre-cleaned, per protocol, at bedside by Lakeshia ANGLIN      ***:  Report received from anesthesia - Mary Ann GOMEZ.  See anesthesia flowsheet for intra-procedure vital signs and events.

## 2024-10-04 ENCOUNTER — HOSPITAL ENCOUNTER (OUTPATIENT)
Facility: HOSPITAL | Age: 59
Setting detail: OUTPATIENT SURGERY
Discharge: HOME | End: 2024-10-04
Attending: INTERNAL MEDICINE | Admitting: INTERNAL MEDICINE
Payer: COMMERCIAL

## 2024-10-04 ENCOUNTER — ANESTHESIA (OUTPATIENT)
Facility: HOSPITAL | Age: 59
End: 2024-10-04
Payer: COMMERCIAL

## 2024-10-04 ENCOUNTER — ANESTHESIA EVENT (OUTPATIENT)
Facility: HOSPITAL | Age: 59
End: 2024-10-04
Payer: COMMERCIAL

## 2024-10-04 VITALS
SYSTOLIC BLOOD PRESSURE: 115 MMHG | DIASTOLIC BLOOD PRESSURE: 95 MMHG | TEMPERATURE: 98.8 F | RESPIRATION RATE: 16 BRPM | WEIGHT: 220.2 LBS | HEIGHT: 74 IN | BODY MASS INDEX: 28.26 KG/M2 | OXYGEN SATURATION: 97 % | HEART RATE: 84 BPM

## 2024-10-04 PROCEDURE — 7100000011 HC PHASE II RECOVERY - ADDTL 15 MIN: Performed by: INTERNAL MEDICINE

## 2024-10-04 PROCEDURE — 2580000003 HC RX 258: Performed by: NURSE ANESTHETIST, CERTIFIED REGISTERED

## 2024-10-04 PROCEDURE — 7100000010 HC PHASE II RECOVERY - FIRST 15 MIN: Performed by: INTERNAL MEDICINE

## 2024-10-04 PROCEDURE — 3600007502: Performed by: INTERNAL MEDICINE

## 2024-10-04 PROCEDURE — 2709999900 HC NON-CHARGEABLE SUPPLY: Performed by: INTERNAL MEDICINE

## 2024-10-04 PROCEDURE — 88305 TISSUE EXAM BY PATHOLOGIST: CPT

## 2024-10-04 PROCEDURE — 3700000000 HC ANESTHESIA ATTENDED CARE: Performed by: INTERNAL MEDICINE

## 2024-10-04 PROCEDURE — 6370000000 HC RX 637 (ALT 250 FOR IP): Performed by: INTERNAL MEDICINE

## 2024-10-04 PROCEDURE — 2580000003 HC RX 258: Performed by: INTERNAL MEDICINE

## 2024-10-04 PROCEDURE — 3600007512: Performed by: INTERNAL MEDICINE

## 2024-10-04 PROCEDURE — 3700000001 HC ADD 15 MINUTES (ANESTHESIA): Performed by: INTERNAL MEDICINE

## 2024-10-04 PROCEDURE — 2500000003 HC RX 250 WO HCPCS: Performed by: NURSE ANESTHETIST, CERTIFIED REGISTERED

## 2024-10-04 PROCEDURE — 6360000002 HC RX W HCPCS: Performed by: NURSE ANESTHETIST, CERTIFIED REGISTERED

## 2024-10-04 RX ORDER — LIDOCAINE HYDROCHLORIDE 20 MG/ML
INJECTION, SOLUTION EPIDURAL; INFILTRATION; INTRACAUDAL; PERINEURAL
Status: DISCONTINUED | OUTPATIENT
Start: 2024-10-04 | End: 2024-10-04 | Stop reason: SDUPTHER

## 2024-10-04 RX ORDER — SIMETHICONE 40MG/0.6ML
SUSPENSION, DROPS(FINAL DOSAGE FORM)(ML) ORAL PRN
Status: DISCONTINUED | OUTPATIENT
Start: 2024-10-04 | End: 2024-10-04 | Stop reason: ALTCHOICE

## 2024-10-04 RX ORDER — SODIUM CHLORIDE 9 MG/ML
25 INJECTION, SOLUTION INTRAVENOUS PRN
Status: DISCONTINUED | OUTPATIENT
Start: 2024-10-04 | End: 2024-10-04 | Stop reason: HOSPADM

## 2024-10-04 RX ORDER — SODIUM CHLORIDE 9 MG/ML
INJECTION, SOLUTION INTRAVENOUS
Status: DISCONTINUED | OUTPATIENT
Start: 2024-10-04 | End: 2024-10-04 | Stop reason: SDUPTHER

## 2024-10-04 RX ORDER — SODIUM CHLORIDE 0.9 % (FLUSH) 0.9 %
5-40 SYRINGE (ML) INJECTION EVERY 12 HOURS SCHEDULED
Status: DISCONTINUED | OUTPATIENT
Start: 2024-10-04 | End: 2024-10-04 | Stop reason: HOSPADM

## 2024-10-04 RX ORDER — SODIUM CHLORIDE 0.9 % (FLUSH) 0.9 %
5-40 SYRINGE (ML) INJECTION PRN
Status: DISCONTINUED | OUTPATIENT
Start: 2024-10-04 | End: 2024-10-04 | Stop reason: HOSPADM

## 2024-10-04 RX ADMIN — SODIUM CHLORIDE 25 ML: 9 INJECTION, SOLUTION INTRAVENOUS at 08:55

## 2024-10-04 RX ADMIN — PROPOFOL 30 MG: 10 INJECTION, EMULSION INTRAVENOUS at 09:06

## 2024-10-04 RX ADMIN — PROPOFOL 20 MG: 10 INJECTION, EMULSION INTRAVENOUS at 08:58

## 2024-10-04 RX ADMIN — PROPOFOL 20 MG: 10 INJECTION, EMULSION INTRAVENOUS at 09:00

## 2024-10-04 RX ADMIN — PROPOFOL 20 MG: 10 INJECTION, EMULSION INTRAVENOUS at 08:57

## 2024-10-04 RX ADMIN — PROPOFOL 80 MG: 10 INJECTION, EMULSION INTRAVENOUS at 08:55

## 2024-10-04 RX ADMIN — LIDOCAINE HYDROCHLORIDE 100 MG: 20 INJECTION, SOLUTION EPIDURAL; INFILTRATION; INTRACAUDAL; PERINEURAL at 08:55

## 2024-10-04 RX ADMIN — PROPOFOL 10 MG: 10 INJECTION, EMULSION INTRAVENOUS at 09:02

## 2024-10-04 RX ADMIN — PROPOFOL 20 MG: 10 INJECTION, EMULSION INTRAVENOUS at 09:04

## 2024-10-04 RX ADMIN — SODIUM CHLORIDE: 9 INJECTION, SOLUTION INTRAVENOUS at 08:52

## 2024-10-04 ASSESSMENT — PAIN - FUNCTIONAL ASSESSMENT: PAIN_FUNCTIONAL_ASSESSMENT: NONE - DENIES PAIN

## 2024-10-04 NOTE — ANESTHESIA POSTPROCEDURE EVALUATION
Department of Anesthesiology  Postprocedure Note    Patient: James Minaya  MRN: [id number]  YOB: 1965  Date of evaluation: 10/4/2024    Procedure Summary       Date: 10/04/24 Room / Location: [facility] ENDO 03 / [facility] ENDOSCOPY    Anesthesia Start: 0852 Anesthesia Stop: 0914    Procedure: COLONOSCOPY WITH BIOPSY/POLYP (Lower GI Region) Diagnosis:       Colon adenoma      (Colon adenoma [D12.6])    Surgeons: Franco Schultz MD Responsible Provider: Marck Campos MD    Anesthesia Type: MAC ASA Status: 1            Anesthesia Type: MAC    Ashish Phase I: Ashish Score: 10    Ashish Phase II: Ashish Score: 10    Anesthesia Post Evaluation    Patient location during evaluation: PACU  Patient participation: complete - patient participated  Level of consciousness: sleepy but conscious and responsive to verbal stimuli  Airway patency: patent  Nausea & Vomiting: no vomiting and no nausea  Cardiovascular status: blood pressure returned to baseline and hemodynamically stable  Respiratory status: acceptable  Hydration status: stable    No notable events documented.

## 2024-10-04 NOTE — PROGRESS NOTES
Endoscopy Case End Note:     Procedure scope was pre-cleaned, per protocol, at bedside by SALO Bermudez.       Report received from anesthesia.  See anesthesia flowsheet for intra-procedure vital signs and events.    Belongings remain under stretcher with patient.

## 2024-10-04 NOTE — H&P
Gastroenterology Outpatient History and Physical    Patient: James AU Rei    Physician: Franco Schultz MD    Chief Complaint: H/o colon polyps  History of Present Illness: No GI complaints    History:  Past Medical History:   Diagnosis Date    Hyperlipidemia     PPD positive, treated 2009    INH for 9 months      Past Surgical History:   Procedure Laterality Date    COLONOSCOPY N/A 2021    COLONOSCOPY performed by Franco Schultz MD at [facility] ENDOSCOPY    COLONOSCOPY  2021    polyps x5    COLONOSCOPY N/A 2021    COLONOSCOPY WITH COLD FORCEP POLYPECTOMIES X 6  AND PROXIMAL TRANSVERSE TATOOING AND COLD FORCEP BIOPSIES X 2. performed by Stephanie Bone MD at [facility] MAIN OR    COLONOSCOPY N/A 3/8/2024    COLONOSCOPY WITH ENDOSCOPIC MUCOSAL RESECTION performed by Franco Schultz MD at [facility] ENDOSCOPY    HEENT Bilateral 2006    lasik    KNEE ARTHROSCOPY Left       Social History     Socioeconomic History    Marital status:      Spouse name: None    Number of children: None    Years of education: None    Highest education level: None   Tobacco Use    Smoking status: Former     Types: Cigarettes     Start date:      Quit date: 1982     Years since quittin.7    Smokeless tobacco: Never    Tobacco comments:     Quit smoking: Vapes.   Vaping Use    Vaping status: Never Used   Substance and Sexual Activity    Alcohol use: Not Currently    Drug use: Never     Social Determinants of Health     Financial Resource Strain: Low Risk  (2024)    [facility]    Overall Financial Resource Strain (CARDIA)     Difficulty of Paying Living Expenses: Not hard at all   Food Insecurity: No Food Insecurity (2024)    Received from [facility]    Hunger Vital Sign     Worried About Running Out of Food in the Last Year: Never true     Ran Out of Food in the Last Year: Never true   Transportation Needs: No  Colonoscopy with conscious/deep sedation    Signed:  Franco Schultz MD 10/4/2024

## 2024-10-04 NOTE — OP NOTE
NAME:  James Minaya   :   1965   MRN:   [id number]     Date/Time:  10/4/2024 9:16 AM    Colonoscopy Operative Report    Procedure Type:   Colonoscopy with biopsy, polypectomy (cold snare)     Indications:     Personal history of colon polyps (screening only)  Pre-operative Diagnosis: see indication above  Post-operative Diagnosis:  See findings below  :  Franco Schultz MD  Referring Provider: --Kathy Ziegler, APRN - NP    Exam:  Airway: clear, no airway problems anticipated  Heart: RRR, without gallops or rubs  Lungs: clear bilaterally without wheezes, crackles, or rhonchi  Abdomen: soft, nontender, nondistended, bowel sounds present  Mental Status: awake, alert and oriented to person, place and time    Sedation:  MAC anesthesia Propofol  Procedure Details:  After informed consent was obtained with all risks and benefits of procedure explained and preoperative exam completed, the patient was taken to the endoscopy suite and placed in the left lateral decubitus position.  Upon sequential sedation as per above, a digital rectal exam was performed demonstrating internal hemorrhoids.  The Olympus videocolonoscope  was inserted in the rectum and carefully advanced to the cecum, which was identified by the ileocecal valve and appendiceal orifice.   The quality of preparation was good.  The colonoscope was slowly withdrawn with careful evaluation between folds. Retroflexion in the rectum was completed demonstrating internal hemorrhoids.     Findings:   Tattoo seen in transverse colon with no evidence of residual/recurrent adenoma  Large post-EMR scar in descending colon, 45 cm proximal from anal verge. Large prior tattoo seen both proximally and distally. This was closely examined and there was no evidence of recurrent/residual adenoma. Biopsied  5 mm sessile polyp in sigmoid colon. Removed by cold snare polypectomy  Moderate sigmoid diverticulosis  Small internal hemorrhoids seen on  retroflexion  Otherwise normal colonoscopy through to the cecum    Specimen Removed:  1. Descending EMR scar 2. Sigmoid polyp  Complications: None.   EBL:  None.    Impression:    Tattoo seen in transverse colon with no evidence of residual/recurrent adenoma  Large post-EMR scar in descending colon, 45 cm proximal from anal verge. Large prior tattoo seen both proximally and distally. This was closely examined and there was no evidence of recurrent/residual adenoma. Biopsied  5 mm sessile polyp in sigmoid colon. Removed by cold snare polypectomy  Moderate sigmoid diverticulosis  Small internal hemorrhoids seen on retroflexion  Otherwise normal colonoscopy through to the cecum    Recommendations:   Follow up pathology  Repeat colonoscopy for surveillance in 1 year given history of numerous, flat advanced adenomas s/p EMR. Needs continued extended bowel preparation and use of an adult colonoscope    Discharge Disposition:  Home in the company of a  when able to ambulate.      Franco Schultz MD

## 2024-10-04 NOTE — DISCHARGE INSTRUCTIONS
James Minaya  [id number]  1965    COLON DISCHARGE INSTRUCTIONS  Discomfort:  Redness at IV site- apply warm compress to area; if redness or soreness persist- contact your physician  There may be a slight amount of blood passed from the rectum  Gaseous discomfort- walking, belching will help relieve any discomfort  You may not operate a vehicle for 12 hours  You may not engage in an occupation involving machinery or appliances for rest of today  You may not drink alcoholic beverages for at least 12 hours  Avoid making any critical decisions for at least 24 hour  DIET:   Regular diet.   - however -  remember your colon is empty and a heavy meal will produce gas.   Avoid these foods:  vegetables, fried / greasy foods, carbonated drinks for today  MEDICATION:  [unfilled]     ACTIVITY:  You may not resume your normal daily activities until tomorrow AM; it is recommended that you spend the remainder of the day resting -  avoid any strenuous activity.  CALL M.D.  ANY SIGN OF:   Increasing pain, nausea, vomiting  Abdominal distension (swelling)  New increased bleeding (oral or rectal)  Fever (chills)  Pain in chest area  Bloody discharge from nose or mouth  Shortness of breath    You may not  take any Advil, Aspirin, Ibuprofen, Motrin, Aleve, or Goody’s for 10 days, ONLY  Tylenol as needed for pain.    IMPRESSION:  Impression:    Tattoo seen in transverse colon with no evidence of residual/recurrent adenoma  Large post-EMR scar in descending colon, 45 cm proximal from anal verge. Large prior tattoo seen both proximally and distally. This was closely examined and there was no evidence of recurrent/residual adenoma. Biopsied  5 mm sessile polyp in sigmoid colon. Removed by cold snare polypectomy  Moderate sigmoid diverticulosis  Small internal hemorrhoids seen on retroflexion  Otherwise normal colonoscopy through to the cecum    Recommendations:   Follow up pathology  Repeat colonoscopy for surveillance in 1 year

## 2024-10-04 NOTE — ANESTHESIA PRE PROCEDURE
for: \"PHART\", \"PO2ART\", \"ANN1CCI\", \"YOP3OIS\", \"BEART\", \"[id number]\"     Type & Screen (If Applicable):  No results found for: \"LABABO\"    Drug/Infectious Status (If Applicable):  Lab Results   Component Value Date/Time    HEPCAB <0.1 02/15/2017 09:46 AM       COVID-19 Screening (If Applicable):   Lab Results   Component Value Date/Time    COVID19 Not detected 11/12/2021 06:18 AM           Anesthesia Evaluation  Patient summary reviewed and Nursing notes reviewed  Airway: Mallampati: I  TM distance: >3 FB   Neck ROM: full  Mouth opening: > = 3 FB   Dental: normal exam         Pulmonary:normal exam                              ROS comment: Former smoker - Quit 2019    Hx PPD positive, treated (INH for 9 months)   Cardiovascular:                   ROS comment: No ECG on file     Neuro/Psych:   Negative Neuro/Psych ROS              GI/Hepatic/Renal: Neg GI/Hepatic/Renal ROS            Endo/Other: Negative Endo/Other ROS                    Abdominal: normal exam            Vascular: negative vascular ROS.         Other Findings:       Anesthesia Plan      MAC     ASA 1       Induction: intravenous.      Anesthetic plan and risks discussed with patient.      Plan discussed with CRNA.                Marck Campos MD   10/4/2024

## 2024-10-04 NOTE — PROGRESS NOTES
ARRIVAL INFORMATION:  Verified patient name and date of birth, scheduled procedure, and informed consent.     : Mariza (wife) contact number: [phone]  Physician and staff can share information with the .     Receive texts: Yes    Belongings with patient include:  Clothing, Left eye contact    GI FOCUSED ASSESSMENT:  Neuro: Awake, alert, oriented x4  Respiratory: even and unlabored   GI: soft and non-distended  EKG Rhythm: normal sinus rhythm    Education:Reviewed general discharge instructions and  information.

## (undated) DEVICE — ENDOSCOPIC KIT COMPLIANCE ENDOKIT

## (undated) DEVICE — Device

## (undated) DEVICE — ENDO CARRY-ON PROCEDURE KIT INCLUDES ENZYMATIC SPONGE, GAUZE, BIOHAZARD LABEL, TRAY, LUBRICANT, DIRTY SCOPE LABEL, WATER LABEL, TRAY, DRAWSTRING PAD, AND DEFENDO 4-PIECE KIT.: Brand: ENDO CARRY-ON PROCEDURE KIT

## (undated) DEVICE — SET GRAV CK VLV NEEDLESS ST 3 GANGED 4WAY STPCOCK HI FLO 10

## (undated) DEVICE — Z DISCONTINUED PER MEDLINE LINE GAS SAMPLING O2/CO2 LNG AD 13 FT NSL W/ TBNG FILTERLINE

## (undated) DEVICE — SNARE ENDOSCP M L240CM W27MM SHTH DIA2.4MM CHN 2.8MM OVL

## (undated) DEVICE — TOWEL 4 PLY TISS 19X30 SUE WHT

## (undated) DEVICE — TRAP ENDOSCP POLYP 2 CHMBR DRAWER TYP

## (undated) DEVICE — NEEDLE SCLERO 25GA L240CM OD0.51MM ID0.24MM EXTN L4MM SHTH

## (undated) DEVICE — NEEDLE HYPO 18GA L1.5IN PNK S STL HUB POLYPR SHLD REG BVL

## (undated) DEVICE — SOL IRR STRL H2O 1000ML BTL --

## (undated) DEVICE — SNARE ENDOSCP POLYP MED 2.4 MM 240 CM 27 MM 2.8 MM SHT SENS

## (undated) DEVICE — SYR 3ML LL TIP 1/10ML GRAD --

## (undated) DEVICE — ACUSNARE POLYPECTOMY SNARE: Brand: ACUSNARE

## (undated) DEVICE — FORCEPS ENDOSCP BX L230CM DIA2.8MM ALGTR CUP SPEC RETRV GI

## (undated) DEVICE — TIP SUCT TRNSPAR RIB SURF STD BLB RIG NVENT W/ 5IN1 CONN DYND50138] MEDLINE INDUSTRIES INC]

## (undated) DEVICE — 1200 GUARD II KIT W/5MM TUBE W/O VAC TUBE: Brand: GUARDIAN

## (undated) DEVICE — SEALANT HEMOSTATIC POWDER ENDOCLOT 30ML

## (undated) DEVICE — IV START KIT: Brand: MEDLINE

## (undated) DEVICE — Device: Brand: SPOT EX ENDOSCOPIC TATTOO

## (undated) DEVICE — FIAPC® PROBE W/ FILTER 2200 C OD 2.3MM/6.9FR; L 2.2M/7.2FT: Brand: ERBE

## (undated) DEVICE — SET ADMIN 16ML TBNG L100IN 2 Y INJ SITE IV PIGGY BK DISP

## (undated) DEVICE — INJECTION THERAPY NEEDLE CATHETER: Brand: INTERJECT

## (undated) DEVICE — FORCEPS BX L240CM JAW DIA2.4MM ORNG L CAP W/ NDL DISP RAD

## (undated) DEVICE — CUFF BLD PRSS AD CLTH SGL TB W/ BAYNT CONN ROUNDED CORNER

## (undated) DEVICE — CONTAINER SPEC 20 ML LID NEUT BUFF FORMALIN 10 % POLYPR STS

## (undated) DEVICE — FIAPC® PROBE W/ FILTER 2200 A OD 2.3MM/6.9FR; L 2.2M/7.2FT: Brand: ERBE

## (undated) DEVICE — STAIN INDIA INK IN NACL 10ML --

## (undated) DEVICE — NEONATAL-ADULT SPO2 SENSOR: Brand: NELLCOR

## (undated) DEVICE — (D)AGENT INJECTN ELEVIEW 10ML -- DISC BY MFG

## (undated) DEVICE — NON-REM POLYHESIVE PATIENT RETURN ELECTRODE: Brand: VALLEYLAB

## (undated) DEVICE — CATH IV AUTOGRD BC PNK 20GA 25 -- INSYTE

## (undated) DEVICE — DISPOSABLE DISTAL ATTACHMENT: Brand: DISPOSABLE DISTAL ATTACHMENT

## (undated) DEVICE — SNARE ENDOSCP POLYP MED STD AD 2.4X27X240 CM 2.8 MM OVL SENS

## (undated) DEVICE — TRAP,MUCUS SPECIMEN, 80CC: Brand: MEDLINE

## (undated) DEVICE — BASIN EMSIS 16OZ GRAPHITE PLAS KID SHP MOLD GRAD FOR ORAL

## (undated) DEVICE — ELECTRODE PT RET AD L9FT HI MOIST COND ADH HYDRGEL CORDED

## (undated) DEVICE — NDL INJ SCLERO 25G 240CM -- INTERJECT M00518360 BX/5

## (undated) DEVICE — STRAINER URIN CALC RNL MSH -- CONVERT TO ITEM 357634

## (undated) DEVICE — ELECTRODE,RADIOTRANSLUCENT,FOAM,5PK: Brand: MEDLINE

## (undated) DEVICE — SOLIDIFIER FLD 2OZ 1500CC N DISINF IN BTL DISP SAFESORB

## (undated) DEVICE — DISPOSABLE DISTAL ATTACHMENT

## (undated) DEVICE — SYR 10ML LUER LOK 1/5ML GRAD --